# Patient Record
Sex: FEMALE | Race: WHITE | NOT HISPANIC OR LATINO | Employment: FULL TIME | URBAN - METROPOLITAN AREA
[De-identification: names, ages, dates, MRNs, and addresses within clinical notes are randomized per-mention and may not be internally consistent; named-entity substitution may affect disease eponyms.]

---

## 2017-01-03 ENCOUNTER — OFFICE VISIT (OUTPATIENT)
Dept: URGENT CARE | Facility: RETAIL CLINIC | Age: 37
End: 2017-01-03
Payer: COMMERCIAL

## 2017-01-03 VITALS
DIASTOLIC BLOOD PRESSURE: 71 MMHG | SYSTOLIC BLOOD PRESSURE: 132 MMHG | TEMPERATURE: 98.5 F | HEART RATE: 84 BPM | OXYGEN SATURATION: 95 %

## 2017-01-03 DIAGNOSIS — H69.93 DYSFUNCTION OF EUSTACHIAN TUBE, BILATERAL: ICD-10-CM

## 2017-01-03 DIAGNOSIS — J02.9 ACUTE PHARYNGITIS, UNSPECIFIED ETIOLOGY: Primary | ICD-10-CM

## 2017-01-03 LAB — S PYO AG THROAT QL IA.RAPID: NORMAL

## 2017-01-03 PROCEDURE — 99213 OFFICE O/P EST LOW 20 MIN: CPT | Performed by: PHYSICIAN ASSISTANT

## 2017-01-03 PROCEDURE — 87880 STREP A ASSAY W/OPTIC: CPT | Mod: QW | Performed by: PHYSICIAN ASSISTANT

## 2017-01-03 PROCEDURE — 87081 CULTURE SCREEN ONLY: CPT | Mod: 90 | Performed by: PHYSICIAN ASSISTANT

## 2017-01-03 RX ORDER — FLUTICASONE PROPIONATE 50 MCG
1-2 SPRAY, SUSPENSION (ML) NASAL DAILY
Qty: 1 BOTTLE | Refills: 1 | Status: SHIPPED | OUTPATIENT
Start: 2017-01-03 | End: 2017-08-21

## 2017-01-03 NOTE — MR AVS SNAPSHOT
After Visit Summary   1/3/2017    Keely Godinez    MRN: [id number]           Patient Information     Date Of Birth          1980        Visit Information        Provider Department      1/3/2017 11:00 AM Samira Cabrera PA-C [facility]        Today's Diagnoses     Acute pharyngitis, unspecified etiology    -  1     Dysfunction of eustachian tube, bilateral           Care Instructions       * PHARYNGITIS (Sore Throat),REPORT PENDING    Pharyngitis (sore throat) is often due to a virus, but can also be caused by the  strep  bacteria. This is called  strep throat . Both viral and strep infection can cause throat pain that is worse when swallowing, aching all over with headache and fever. Both types of infections are contagious. They may be spread by coughing, kissing or touching others after touching your mouth or nose, so wash your hands often.  A test has been done to determine whether or not you have strep throat. If it is positive for strep infection you will usually need to take antibiotics. If the test is negative, you probably have a viral pharyngitis, and antibiotic treatment will not help you recover.  HOME CARE:    If your symptoms are severe, rest at home for the first 2-3 days. If you are told that your test is positive for strep, you should be off work and school for the first two days of antibiotic treatment. After that, you will no longer be as contagious.    Children: Use acetaminophen (Tylenol) for fever, fussiness or discomfort. In infants over six months of age, you may use ibuprofen (Children's Motrin) instead of Tylenol. [NOTE: If your child has chronic liver or kidney disease or ever had a stomach ulcer or GI bleeding, talk with your doctor before using these medicines.]   (Aspirin should never be used in anyone under 18 years of age who is ill with a fever. It may cause severe liver damage.)  Adults: You may use acetaminophen (Tylenol) 650-1000 mg every 6  hours or ibuprofen (Motrin, Advil) 600 mg every 6-8 hours with food to control pain, if you are able to take these medicines. [NOTE: If you have chronic liver or kidney disease or ever had a stomach ulcer or GI bleeding, talk with your doctor before using these medicines.]    Throat lozenges or sprays (Chloraseptic and others), or gargling with warm salt water will reduce throat pain. Dissolve 1/2 teaspoon of salt in 1 glass of warm water. This is especially useful just before meals.     FOLLOW UP with your doctor as advised by our staff if you are not improving over the next week.  GET PROMPT MEDICAL ATTENTION  if any of the following occur:    Fever over 101 F (38.3 C) for more than three days    New or worsening ear pain, sinus pain or headache    Unable to swallow liquids or open your mouth wide due to throat pain    Trouble breathing    Muffled voice    New rash       [id number] [place] [address]. All rights reserved. This information is not intended as a substitute for professional medical care. Always follow your healthcare professional's instructions.          Follow-ups after your visit        Who to contact     You can reach your care team any time of the day by calling [phone].  Notification of test results:  If you have an abnormal lab result, we will notify you by phone as soon as possible.         Additional Information About Your Visit        MyChart Information     [employer] gives you secure access to your electronic health record. If you see a primary care provider, you can also send messages to your care team and make appointments. If you have questions, please call your primary care clinic.  If you do not have a primary care provider, please call [phone] and they will assist you.        Care EveryWhere ID     This is your Care EveryWhere ID. This could be used by other organizations to access your [facility] medical records  [id number]        Your  Vitals Were     Pulse Temperature Pulse Oximetry             84 98.5  F (36.9  C) (Tympanic) 95%          Blood Pressure from Last 3 Encounters:   01/03/17 132/71   11/28/16 135/82   10/10/16 128/93    Weight from Last 3 Encounters:   12/02/16 204 lb (92.534 kg)   11/03/16 204 lb (92.534 kg)   10/21/16 202 lb 9.6 oz (91.899 kg)              We Performed the Following     BETA STREP GROUP A R/O CULTURE     RAPID STREP SCREEN          Today's Medication Changes          These changes are accurate as of: 1/3/17 11:53 AM.  If you have any questions, ask your nurse or doctor.               Start taking these medicines.        Dose/Directions    fluticasone 50 MCG/ACT spray   Commonly known as:  FLONASE   Used for:  Dysfunction of eustachian tube, bilateral   Started by:  Samira Cabrera PA-C        Dose:  1-2 spray   Spray 1-2 sprays into both nostrils daily   Quantity:  1 Bottle   Refills:  1            Where to get your medicines      These medications were sent to [address]  [address] [facility] [address]     Phone:  [phone]    - fluticasone 50 MCG/ACT spray             Primary Care Provider Office Phone #    [employer] [facility] [phone]       No address on file        Thank you!     Thank you for choosing [facility]  for your care. Our goal is always to provide you with excellent care. Hearing back from our patients is one way we can continue to improve our services. Please take a few minutes to complete the written survey that you may receive in the mail after your visit with us. Thank you!             Your Updated Medication List - Protect others around you: Learn how to safely use, store and throw away your medicines at www.disposemymeds.org.          This list is accurate as of: 1/3/17 11:53 AM.  Always use your most recent med list.                   Brand Name Dispense Instructions for use    fluticasone 50 MCG/ACT spray     FLONASE    1 Bottle    Spray 1-2 sprays into both nostrils daily       IBUPROFEN PO      Take 600 mg by mouth as needed       nicotine 21 MG/24HR 24 hr patch    NICODERM CQ    30 patch    Place 1 patch onto the skin every 24 hours       OMEPRAZOLE PO      Take 20 mg by mouth as needed

## 2017-01-03 NOTE — PROGRESS NOTES
SUBJECTIVE:   Pt. presenting to [facility] -  with a chief complaint of ST. Ears plugged. Minimal nasal congestion. No cough..  Onset of symptoms gradual  Course of illness is same.    Severity moderate  Current and Associated symptoms: sore throat  Treatment measures tried include Fluids, OTC meds and Rest.  Predisposing factors include recent illness and tobacco abuse.  Last antibiotic 11/2016 for bronchitis    Pregnancy no  Smoker yes    Past Medical History   Diagnosis Date     Uncomplicated asthma      ASCUS of cervix with negative high risk HPV 5/26/16 5/26/16 ASCUS/neg HR HPV      Past Surgical History   Procedure Laterality Date     No history of surgery       Patient Active Problem List   Diagnosis     Tobacco use disorder     ASCUS of cervix with negative high risk HPV     Non morbid obesity due to excess calories     Persistent insomnia     CORINA (obstructive sleep apnea)- mild (AHI 6)     Acute pain of right shoulder     Current Outpatient Prescriptions   Medication     nicotine (NICODERM CQ) 21 MG/24HR 24 hr patch     OMEPRAZOLE PO     IBUPROFEN PO     No current facility-administered medications for this visit.     OBJECTIVE:  /71 mmHg  Pulse 84  Temp(Src) 98.5  F (36.9  C) (Tympanic)  SpO2 95%    GENERAL APPEARANCE: cooperative, alert and no distress. Appears well hydrated.  EYES: conjunctiva clear  HENT: Rt ear canal  clear and TM normal   Lt ear canal clear and TM normal   Nose some congestion. clear discharge  Mouth without ulcers or lesions. mild erythema. no exudate.  NECK: supple, few small shoddy NT ant nodes. No  posterior nodes.  RESP: lungs clear to auscultation - no rales, rhonchi or wheezes. Breathing easily.  CV: regular rates and rhythm  ABDOMEN:  soft, nontender, no HSM or masses and bowel sounds normal   SKIN: no suspicious lesions or rashes  no tenderness to palpate over  sinus areas.    Rapid strep neg    ASSESSMENT:     Acute pharyngitis, unspecified  etiology  Dysfunction of eustachian tube, bilateral      PLAN:  Symptomatic measures   Discussed ETD - try Flonase, saline nose spray and gentle autoinsufflation  Prescriptions as below. Discussed indications, dosing, side affects and adverse reactions of medications with  Patient -Flonase  Salt water gargles - throat lozenges or honey/lemon tea if soothing   Throat culture pending - will be notified of positive results only.  Cool mist vaporizer.  Smoking discouraged  -Discussed > CV,CP and cancer risks with tobacco use.   Stay in clean air environment.  > rest.  > fluids.  Contagiousness and hygiene discussed.  Fever and pain  control measures discussed.   If unable to swallow or any breathing difficulty to go to ED     Follow up with your primary care provider if not improving, anytime if worse and if symptoms do not resolve.  AVS given and discussed:  Patient Instructions      * PHARYNGITIS (Sore Throat),REPORT PENDING    Pharyngitis (sore throat) is often due to a virus, but can also be caused by the  strep  bacteria. This is called  strep throat . Both viral and strep infection can cause throat pain that is worse when swallowing, aching all over with headache and fever. Both types of infections are contagious. They may be spread by coughing, kissing or touching others after touching your mouth or nose, so wash your hands often.  A test has been done to determine whether or not you have strep throat. If it is positive for strep infection you will usually need to take antibiotics. If the test is negative, you probably have a viral pharyngitis, and antibiotic treatment will not help you recover.  HOME CARE:    If your symptoms are severe, rest at home for the first 2-3 days. If you are told that your test is positive for strep, you should be off work and school for the first two days of antibiotic treatment. After that, you will no longer be as contagious.    Children: Use acetaminophen (Tylenol) for fever,  fussiness or discomfort. In infants over six months of age, you may use ibuprofen (Children's Motrin) instead of Tylenol. [NOTE: If your child has chronic liver or kidney disease or ever had a stomach ulcer or GI bleeding, talk with your doctor before using these medicines.]   (Aspirin should never be used in anyone under 18 years of age who is ill with a fever. It may cause severe liver damage.)  Adults: You may use acetaminophen (Tylenol) 650-1000 mg every 6 hours or ibuprofen (Motrin, Advil) 600 mg every 6-8 hours with food to control pain, if you are able to take these medicines. [NOTE: If you have chronic liver or kidney disease or ever had a stomach ulcer or GI bleeding, talk with your doctor before using these medicines.]    Throat lozenges or sprays (Chloraseptic and others), or gargling with warm salt water will reduce throat pain. Dissolve 1/2 teaspoon of salt in 1 glass of warm water. This is especially useful just before meals.     FOLLOW UP with your doctor as advised by our staff if you are not improving over the next week.  GET PROMPT MEDICAL ATTENTION  if any of the following occur:    Fever over 101 F (38.3 C) for more than three days    New or worsening ear pain, sinus pain or headache    Unable to swallow liquids or open your mouth wide due to throat pain    Trouble breathing    Muffled voice    New rash       [id number] [employer] [facility], [address] [place] [address]. All rights reserved. This information is not intended as a substitute for professional medical care. Always follow your healthcare professional's instructions.        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  Electronically signed,  GENA Cabrera, PAC

## 2017-01-03 NOTE — Clinical Note
[facility]  [address] [place] [id number]        1/3/2017    Keely Riggs was seen 1/3/2017 at the [facility] in [place], [place]. Please excuse Keely from work 1/2,3 and possibly 4/2017  due to illness. Keely may return to work 1/4/2017 if  afebrile x 1 day and feeling better.      Cordially,        Samira Cabrera, PAC

## 2017-01-03 NOTE — PATIENT INSTRUCTIONS

## 2017-01-05 ENCOUNTER — OFFICE VISIT (OUTPATIENT)
Dept: FAMILY MEDICINE | Facility: CLINIC | Age: 37
End: 2017-01-05
Payer: COMMERCIAL

## 2017-01-05 VITALS
BODY MASS INDEX: 35.51 KG/M2 | RESPIRATION RATE: 18 BRPM | DIASTOLIC BLOOD PRESSURE: 82 MMHG | WEIGHT: 207 LBS | TEMPERATURE: 98.1 F | HEART RATE: 96 BPM | SYSTOLIC BLOOD PRESSURE: 122 MMHG | OXYGEN SATURATION: 100 %

## 2017-01-05 DIAGNOSIS — H65.01 RIGHT ACUTE SEROUS OTITIS MEDIA, RECURRENCE NOT SPECIFIED: ICD-10-CM

## 2017-01-05 DIAGNOSIS — J06.9 VIRAL URI WITH COUGH: Primary | ICD-10-CM

## 2017-01-05 LAB — BETA STREP CONFIRM: NORMAL

## 2017-01-05 PROCEDURE — 99213 OFFICE O/P EST LOW 20 MIN: CPT | Performed by: NURSE PRACTITIONER

## 2017-01-05 RX ORDER — AMOXICILLIN 875 MG
875 TABLET ORAL 2 TIMES DAILY
Qty: 20 TABLET | Refills: 0 | Status: SHIPPED | OUTPATIENT
Start: 2017-01-05 | End: 2017-02-03

## 2017-01-05 ASSESSMENT — PAIN SCALES - GENERAL: PAINLEVEL: MODERATE PAIN (4)

## 2017-01-05 NOTE — NURSING NOTE
"Chief Complaint   Patient presents with     Pharyngitis       Initial There were no vitals taken for this visit. Estimated body mass index is 35.00 kg/(m^2) as calculated from the following:    Height as of 12/2/16: 5' 4\" (1.626 m).    Weight as of 12/2/16: 204 lb (92.534 kg).  BP completed using cuff size: yimi Alford MA      "

## 2017-01-05 NOTE — MR AVS SNAPSHOT
After Visit Summary   1/5/2017    Keely Godinez    MRN: [id number]           Patient Information     Date Of Birth          1980        Visit Information        Provider Department      1/5/2017 2:45 PM Johanna Sharma APRN CNP [facility]        Today's Diagnoses     Viral URI with cough    -  1     Right acute serous otitis media, recurrence not specified            Follow-ups after your visit        Who to contact     If you have questions or need follow up information about today's clinic visit or your schedule please contact [facility] directly at [phone].  Normal or non-critical lab and imaging results will be communicated to you by Diabeticahart, letter or phone within 4 business days after the clinic has received the results. If you do not hear from us within 7 days, please contact the clinic through Diabeticahart or phone. If you have a critical or abnormal lab result, we will notify you by phone as soon as possible.  Submit refill requests through [employer] or call your pharmacy and they will forward the refill request to us. Please allow 3 business days for your refill to be completed.          Additional Information About Your Visit        MyChart Information     [employer] gives you secure access to your electronic health record. If you see a primary care provider, you can also send messages to your care team and make appointments. If you have questions, please call your primary care clinic.  If you do not have a primary care provider, please call [phone] and they will assist you.        Care EveryWhere ID     This is your Care EveryWhere ID. This could be used by other organizations to access your [facility] medical records  [id number]        Your Vitals Were     Pulse Temperature Respirations Pulse Oximetry          96 98.1  F (36.7  C) (Tympanic) 18 100%         Blood Pressure from Last 3 Encounters:   01/05/17 122/82   01/03/17 132/71   11/28/16 135/82     Weight from Last 3 Encounters:   01/05/17 207 lb (93.895 kg)   12/02/16 204 lb (92.534 kg)   11/03/16 204 lb (92.534 kg)              Today, you had the following     No orders found for display         Today's Medication Changes          These changes are accurate as of: 1/5/17  3:42 PM.  If you have any questions, ask your nurse or doctor.               Start taking these medicines.        Dose/Directions    amoxicillin 875 MG tablet   Commonly known as:  AMOXIL   Used for:  Right acute serous otitis media, recurrence not specified   Started by:  Johanna Sharma APRN CNP        Dose:  875 mg   Take 1 tablet (875 mg) by mouth 2 times daily   Quantity:  20 tablet   Refills:  0            Where to get your medicines      These medications were sent to [facility], [place] - [address]   [address] [place] [address]     Phone:  [phone]    - amoxicillin 875 MG tablet             Primary Care Provider Office Phone #    [facility] [phone]       No address on file        Thank you!     Thank you for choosing [facility]  for your care. Our goal is always to provide you with excellent care. Hearing back from our patients is one way we can continue to improve our services. Please take a few minutes to complete the written survey that you may receive in the mail after your visit with us. Thank you!             Your Updated Medication List - Protect others around you: Learn how to safely use, store and throw away your medicines at www.disposemymeds.org.          This list is accurate as of: 1/5/17  3:42 PM.  Always use your most recent med list.                   Brand Name Dispense Instructions for use    amoxicillin 875 MG tablet    AMOXIL    20 tablet    Take 1 tablet (875 mg) by mouth 2 times daily       fluticasone 50 MCG/ACT spray    FLONASE    1 Bottle    Spray 1-2 sprays into both nostrils daily       IBUPROFEN PO      Take  600 mg by mouth as needed       nicotine 21 MG/24HR 24 hr patch    NICODERM CQ    30 patch    Place 1 patch onto the skin every 24 hours       OMEPRAZOLE PO      Take 20 mg by mouth as needed

## 2017-01-05 NOTE — PROGRESS NOTES
SUBJECTIVE:                                                    Keely Godinez is a 36 year old female who presents to clinic today for the following health issues:      Acute Illness   Acute illness concerns: sore throat , cough   Onset: 5 days      Fever: YES- at night     Chills/Sweats: no    Headache (location?): YES    Sinus Pressure:yes     Conjunctivitis:  no    Ear Pain: YES: both    Rhinorrhea: YES    Congestion: YES    Sore Throat: YES     Cough: YES    Wheeze: YES    Decreased Appetite: yes    Nausea: no    Vomiting: no    Diarrhea:  no    Dysuria/Freq.: no    Fatigue/Achiness: YES    Sick/Strep Exposure: no     Therapies Tried and outcome: ibuprofen       Patient is a 36-year-old female who was seen in [facility] 2 days ago with very sore throat. Strep culture has returned negative. She reports nasal congestion, chest congestion and persistent nonproductive cough. She also reports ear pain, diminished hearing, particularly in the right ear.    Problem list and histories reviewed & adjusted, as indicated.  Additional history: as documented    BP Readings from Last 3 Encounters:   01/05/17 122/82   01/03/17 132/71   11/28/16 135/82    Wt Readings from Last 3 Encounters:   01/05/17 207 lb (93.895 kg)   12/02/16 204 lb (92.534 kg)   11/03/16 204 lb (92.534 kg)                    ROS:  Constitutional, HEENT, cardiovascular, pulmonary, gi and gu systems are negative, except as otherwise noted.    OBJECTIVE:                                                    /82 mmHg  Pulse 96  Temp(Src) 98.1  F (36.7  C) (Tympanic)  Resp 18  Wt 207 lb (93.895 kg)  SpO2 100%  Body mass index is 35.51 kg/(m^2).  GENERAL: well-nourished, well-hydrated, no acute distress  EYES: Eyes grossly normal to inspection, PERRL and conjunctivae and sclerae normal  HENT: left ear canal is clear, TM pearly gray. Right ear canal is clear, TM is erythematous and bulging with loss of normal landmarks. Moderate erythema posterior  oropharynx without exudate  NECK: no adenopathy, no asymmetry, masses, or scars and thyroid normal to palpation  RESP: lungs clear to auscultation - no rales, rhonchi or wheezes  CV: regular rates and rhythm, normal S1 S2, no S3 or S4 and no murmur, click or rub         ASSESSMENT/PLAN:                                                      Problem List Items Addressed This Visit     None      Visit Diagnoses     Viral URI with cough    -  Primary     Right acute serous otitis media, recurrence not specified         Relevant Medications     amoxicillin (AMOXIL) 875 MG tablet          Symptomatic measures for management of cough including increased humidification, Mucinex, Robitussin or Delsym for cough suppression as needed; avoid cigarette smoking  Amoxicillin 875 mg b.i.d. For 10 days for treatment of otitis media  She has a prescription for Flonase, instructed to instill the sprays into the right nostril while lying supine and turning her head towards the right in order to facilitate  Relieving inflammation of the eustachian tube    The anticipated course of a viral illness was discussed with the patient. Follow up in clinic if symptoms have not improved in 5-6 days    OLIVERIO White CNP  [facility]

## 2017-02-03 ENCOUNTER — OFFICE VISIT (OUTPATIENT)
Dept: FAMILY MEDICINE | Facility: CLINIC | Age: 37
End: 2017-02-03
Payer: COMMERCIAL

## 2017-02-03 VITALS
RESPIRATION RATE: 16 BRPM | HEART RATE: 96 BPM | DIASTOLIC BLOOD PRESSURE: 60 MMHG | SYSTOLIC BLOOD PRESSURE: 104 MMHG | HEIGHT: 64 IN | WEIGHT: 206.1 LBS | BODY MASS INDEX: 35.19 KG/M2 | OXYGEN SATURATION: 100 % | TEMPERATURE: 97.8 F

## 2017-02-03 DIAGNOSIS — J20.9 ACUTE BRONCHITIS WITH SYMPTOMS > 10 DAYS: ICD-10-CM

## 2017-02-03 PROCEDURE — 99213 OFFICE O/P EST LOW 20 MIN: CPT | Performed by: FAMILY MEDICINE

## 2017-02-03 RX ORDER — ALBUTEROL SULFATE 90 UG/1
2 AEROSOL, METERED RESPIRATORY (INHALATION) EVERY 4 HOURS PRN
Qty: 1 INHALER | Refills: 0 | Status: SHIPPED | OUTPATIENT
Start: 2017-02-03 | End: 2017-08-21

## 2017-02-03 RX ORDER — CETIRIZINE HYDROCHLORIDE 10 MG/1
10 TABLET ORAL EVERY EVENING
Qty: 30 TABLET | Refills: 3 | Status: SHIPPED | OUTPATIENT
Start: 2017-02-03 | End: 2019-04-29

## 2017-02-03 RX ORDER — AZITHROMYCIN 250 MG/1
TABLET, FILM COATED ORAL
Qty: 6 TABLET | Refills: 0 | Status: SHIPPED | OUTPATIENT
Start: 2017-02-03 | End: 2017-08-21

## 2017-02-03 ASSESSMENT — PAIN SCALES - GENERAL: PAINLEVEL: NO PAIN (0)

## 2017-02-03 NOTE — PROGRESS NOTES
SUBJECTIVE:                                                    Keely Godinez is a 36 year old female who presents to clinic today for the following health issues:    Acute Illness   Acute illness concerns: cough, sinus, voice  Onset: on and off 1 month     Fever: no    Chills/Sweats: YES    Headache (location?): YES    Sinus Pressure:YES    Conjunctivitis:  YES- sometimes    Ear Pain: YES: left    Rhinorrhea: YES    Congestion: YES    Sore Throat: YES     Cough: YES-productive of green sputum    Wheeze: no    Decreased Appetite: no    Nausea: no    Vomiting: no    Diarrhea:  no    Dysuria/Freq.: no    Fatigue/Achiness: no    Sick/Strep Exposure: no     Therapies Tried and outcome: amoxicillin    Also has a lot of pressure on the ears with sinus pressure and pain. Coughing - productive which is worse at night. No wheezing or tightness sensation.  Eating and drinking ok. No tooth pain or sensitivity.  No N/V. Smoking, but has cut back significantly.  Sore throat is resolving. No wheezing or chest tightness sensation. No dyspnea, orthopnea or leg swelling. The soreness is getting better slowly. The cough is getting worse.    Problem list and histories reviewed & adjusted, as indicated.  Additional history: as documented    Patient Active Problem List   Diagnosis     Tobacco use disorder     ASCUS of cervix with negative high risk HPV     Non morbid obesity due to excess calories     Persistent insomnia     CORINA (obstructive sleep apnea)- mild (AHI 6)     Past Surgical History   Procedure Laterality Date     No history of surgery         Social History   Substance Use Topics     Smoking status: Current Every Day Smoker -- 0.75 packs/day for 23 years     Types: Cigarettes     Smokeless tobacco: Never Used      Comment: started age 13     Alcohol Use: 0.0 oz/week     0 Standard drinks or equivalent per week      Comment: socially     Family History   Problem Relation Age of Onset     Sleep Apnea Mother      Obesity  "Mother      Suicide Father 37     Depression Father      Anxiety Disorder Father      Asthma Maternal Grandmother      Coronary Artery Disease Maternal Grandfather      Coronary Artery Disease Paternal Grandfather      Obesity Brother      Sleep Apnea Brother      Sleep Apnea Mother          Current Outpatient Prescriptions   Medication Sig Dispense Refill     fluticasone (FLONASE) 50 MCG/ACT spray Spray 1-2 sprays into both nostrils daily 1 Bottle 1     OMEPRAZOLE PO Take 20 mg by mouth as needed       IBUPROFEN PO Take 600 mg by mouth as needed        nicotine (NICODERM CQ) 21 MG/24HR 24 hr patch Place 1 patch onto the skin every 24 hours 30 patch 1     Allergies   Allergen Reactions     Fentanyl      Other reaction(s): Respiratory Depression     Midazolam      Other reaction(s): Respiratory Depression     No Clinical Screening - See Comments Difficulty breathing     She is unsure of name. Numbing medicine, starts with an \"s or C\".      Seasonal Allergies      Varenicline      assaultive ideation       ROS:  Constitutional, HEENT, cardiovascular, pulmonary, gi and gu systems are negative, except as otherwise noted.    OBJECTIVE:                                                    /60 mmHg  Pulse 96  Temp(Src) 97.8  F (36.6  C) (Temporal)  Resp 16  Ht 5' 4\" (1.626 m)  Wt 206 lb 1.6 oz (93.486 kg)  BMI 35.36 kg/m2  SpO2 100%  LMP 01/09/2017  Breastfeeding? No  Body mass index is 35.36 kg/(m^2).   GENERAL: healthy, alert and no distress.  Speak in full sentences.  HENT: ear canals and TM's normal.  Nares are congested with clear drainage.  Oropharynx is pink and moist.  No tonsilar redness, exudate or hypertrophy.  No tender with palpation to the sinuses.  NECK: no adenopathy.  RESP: lungs clear to auscultation - no rales, rhonchi or wheezes  CV: regular rate and rhythm, no murmur.  ABDOMEN: soft, non-tender and bowel sounds normal      Diagnostic Test Results:  none      ASSESSMENT/PLAN:                "                                         ICD-10-CM    1. Acute bronchitis with symptoms > 10 days J20.9 cetirizine (ZYRTEC) 10 MG tablet     azithromycin (ZITHROMAX) 250 MG tablet     albuterol (PROAIR HFA/PROVENTIL HFA/VENTOLIN HFA) 108 (90 BASE) MCG/ACT Inhaler     Discussed with her about the nature of the condition.  Start Zytec and continue with Flonase.  Started her a 5 days course of Zithromax and encourage her to complete the whole course of antibiotic as prescribed.  Albuterol inhaler as needed for coughing, wheezing, chest tightness sensation or shortness of breath. Encouraged to stop smoking. Call in if not improve or worsening of he symptoms.  All of her questions were answered.    Juan Nuno Mai, MD  [facility]

## 2017-02-03 NOTE — NURSING NOTE
"Chief Complaint   Patient presents with     Sinus Problem     Cough       Initial /60 mmHg  Pulse 96  Temp(Src) 97.8  F (36.6  C) (Temporal)  Resp 16  Ht 5' 4\" (1.626 m)  Wt 206 lb 1.6 oz (93.486 kg)  BMI 35.36 kg/m2  SpO2 100%  LMP 01/09/2017  Breastfeeding? No Estimated body mass index is 35.36 kg/(m^2) as calculated from the following:    Height as of this encounter: 5' 4\" (1.626 m).    Weight as of this encounter: 206 lb 1.6 oz (93.486 kg).  BP completed using cuff size: large    Health Maintenance Due   Topic Date Due     INFLUENZA VACCINE (SYSTEM ASSIGNED)  09/01/2016     Lobo Hillman CMA      "

## 2017-08-21 ENCOUNTER — TELEPHONE (OUTPATIENT)
Dept: FAMILY MEDICINE | Facility: CLINIC | Age: 37
End: 2017-08-21

## 2017-08-21 ENCOUNTER — OFFICE VISIT (OUTPATIENT)
Dept: FAMILY MEDICINE | Facility: CLINIC | Age: 37
End: 2017-08-21
Payer: COMMERCIAL

## 2017-08-21 VITALS
WEIGHT: 206 LBS | HEIGHT: 64 IN | HEART RATE: 76 BPM | OXYGEN SATURATION: 99 % | BODY MASS INDEX: 35.17 KG/M2 | DIASTOLIC BLOOD PRESSURE: 80 MMHG | SYSTOLIC BLOOD PRESSURE: 120 MMHG | RESPIRATION RATE: 18 BRPM | TEMPERATURE: 98 F

## 2017-08-21 DIAGNOSIS — H66.002 ACUTE SUPPURATIVE OTITIS MEDIA OF LEFT EAR WITHOUT SPONTANEOUS RUPTURE OF TYMPANIC MEMBRANE, RECURRENCE NOT SPECIFIED: Primary | ICD-10-CM

## 2017-08-21 DIAGNOSIS — H66.002 ACUTE SUPPURATIVE OTITIS MEDIA OF LEFT EAR WITHOUT SPONTANEOUS RUPTURE OF TYMPANIC MEMBRANE, RECURRENCE NOT SPECIFIED: ICD-10-CM

## 2017-08-21 DIAGNOSIS — Z72.0 TOBACCO ABUSE: ICD-10-CM

## 2017-08-21 DIAGNOSIS — J30.2 ACUTE SEASONAL ALLERGIC RHINITIS, UNSPECIFIED TRIGGER: ICD-10-CM

## 2017-08-21 PROCEDURE — 99214 OFFICE O/P EST MOD 30 MIN: CPT | Performed by: PHYSICIAN ASSISTANT

## 2017-08-21 RX ORDER — VARENICLINE TARTRATE 1 MG/1
1 TABLET, FILM COATED ORAL 2 TIMES DAILY
Qty: 56 TABLET | Refills: 2 | COMMUNITY
Start: 2017-08-21 | End: 2017-11-18

## 2017-08-21 RX ORDER — AMOXICILLIN 875 MG
875 TABLET ORAL 2 TIMES DAILY
Qty: 14 TABLET | Refills: 0 | Status: SHIPPED | OUTPATIENT
Start: 2017-08-21 | End: 2017-08-21

## 2017-08-21 RX ORDER — AMOXICILLIN 875 MG
875 TABLET ORAL 2 TIMES DAILY
Qty: 14 TABLET | Refills: 0 | COMMUNITY
Start: 2017-08-21 | End: 2017-11-18

## 2017-08-21 RX ORDER — VARENICLINE TARTRATE 1 MG/1
1 TABLET, FILM COATED ORAL 2 TIMES DAILY
Qty: 56 TABLET | Refills: 2 | Status: SHIPPED | OUTPATIENT
Start: 2017-08-21 | End: 2017-08-21

## 2017-08-21 NOTE — TELEPHONE ENCOUNTER
Rx sent today by Leola to [facility] pharmacy. She was at [facility] in [place] where they were to be sent.    Rx's cancelled at [facility] pharmacy and phone to [facility].      RN please sign off on this to reflect pharmacy called to.

## 2017-08-21 NOTE — PATIENT INSTRUCTIONS
"L ear infection, R looks fine  Suspect allergies as underlying cause, but could have been viral as well  Suggest flonase or nasacort allergy sprays.  Best benefit after 2 wks or so of daily use.  Or claritin, zyrtec, or allegra OTC allergy pills.    Retrying chantix - self monitor and have a few others monitor for behavioral/mood concerns, and quit med if becoming problematic  Discussed ways to quit smoking  See me if having more troubles      How to quit smoking:    Know your reasons for quitting!  Remind yourself of these reasons when you struggle with the urge to smoking.  Post these reasons in a visible place such as a post-it note on your mirror in the bathroom or on the dash of your car.    Pick a quit date.  Set yourself up for success by planning how to be successful.  Have a strategy, including having others hold you accountable to your plans to quit.    Tell others you are quitting.  Try to get them to encourage your progress and hold you accountable, but not be overly praising for just telling them of your intention to quit.  Too much praise at start (other than encouragement) actually can discourage quitting as you will have received positive feedback already and be less inclined to follow through on plans to quit.  Have people hold you accountable -- ask them to challenge you to quit smoking if they see you picking up a cigarette.  Don't have them turn a \"blind eye\".  The best people to hold you accountable are those who genuinely care about you and are around you frequently.    Remove triggers for smoking.  Don't hang out with other smokers, or alternatively band them with you in attempt to quit.  If you always smoke while in the car, have a strategy in place to deal with the smoking urge that is likely triggered by being in a car.      Throw away all smoking gear -- cigs, lighters, dasha trays.  Don't put them in the basement for use \"in case you fail to quit smoking\".  No -- make it harder for yourself " to start smoking again.    Set up a reward for yourself.  This should be a non-food reward.  Set aside the money that you are saving by not buying cigarettes, and have fun with it if you have not smoked for 3 months (or whatever your goal is -- it might be longer).  Alternatively, some people will set aside time or money to support an organization that they dislike; the idea is that success in quitting means that they don't have to support that detested organization, and so they feel more motivated to succeed in quitting smoking.  Have someone hold you accountable to this.    When you feel urge to smoke, set yourself up for success.  Remove access to cigarettes -- go for walk without wallet, call a support person, delay acting on urge for 15-30 minutes or more, remind yourself of your motivations for quitting.  Ok to use nicotine gum or other nicotine replacement but it may not have any benefit due to how Chantix works.      Have a replacement activity available for your hands or mouth.  You might chew gum (nicotine free or otherwise) or chew toothpick. You might draw or do other activity with hands.     Be gentle with yourself -- if you start smoking, just pick yourself up again and quit again.  If you smoke a few cigarettes some day, it doesn't mean the day is a failure (or that you should just smoke all you want on that day).  Each cigarette that you don't smoke is a success, and so fight for each success.      If you don't manage to quit smoking on this try, try again!  When you succeed in quitting, each year on the anniversary of your quit date, celebrate!  This is a huge success.

## 2017-08-21 NOTE — MR AVS SNAPSHOT
"              After Visit Summary   8/21/2017    Keely Godinez    MRN: [id number]           Patient Information     Date Of Birth          1980        Visit Information        Provider Department      8/21/2017 8:40 AM Leola Jackson PA-C [facility]        Today's Diagnoses     Acute suppurative otitis media of left ear without spontaneous rupture of tympanic membrane, recurrence not specified    -  1    Acute seasonal allergic rhinitis, unspecified trigger        Tobacco abuse          Care Instructions    L ear infection, R looks fine  Suspect allergies as underlying cause, but could have been viral as well  Suggest flonase or nasacort allergy sprays.  Best benefit after 2 wks or so of daily use.  Or claritin, zyrtec, or allegra OTC allergy pills.    Retrying chantix - self monitor and have a few others monitor for behavioral/mood concerns, and quit med if becoming problematic  Discussed ways to quit smoking  See me if having more troubles      How to quit smoking:    Know your reasons for quitting!  Remind yourself of these reasons when you struggle with the urge to smoking.  Post these reasons in a visible place such as a post-it note on your mirror in the bathroom or on the dash of your car.    Pick a quit date.  Set yourself up for success by planning how to be successful.  Have a strategy, including having others hold you accountable to your plans to quit.    Tell others you are quitting.  Try to get them to encourage your progress and hold you accountable, but not be overly praising for just telling them of your intention to quit.  Too much praise at start (other than encouragement) actually can discourage quitting as you will have received positive feedback already and be less inclined to follow through on plans to quit.  Have people hold you accountable -- ask them to challenge you to quit smoking if they see you picking up a cigarette.  Don't have them turn a \"blind eye\".  " "The best people to hold you accountable are those who genuinely care about you and are around you frequently.    Remove triggers for smoking.  Don't hang out with other smokers, or alternatively band them with you in attempt to quit.  If you always smoke while in the car, have a strategy in place to deal with the smoking urge that is likely triggered by being in a car.      Throw away all smoking gear -- cigs, lighters, dasha trays.  Don't put them in the basement for use \"in case you fail to quit smoking\".  No -- make it harder for yourself to start smoking again.    Set up a reward for yourself.  This should be a non-food reward.  Set aside the money that you are saving by not buying cigarettes, and have fun with it if you have not smoked for 3 months (or whatever your goal is -- it might be longer).  Alternatively, some people will set aside time or money to support an organization that they dislike; the idea is that success in quitting means that they don't have to support that detested organization, and so they feel more motivated to succeed in quitting smoking.  Have someone hold you accountable to this.    When you feel urge to smoke, set yourself up for success.  Remove access to cigarettes -- go for walk without wallet, call a support person, delay acting on urge for 15-30 minutes or more, remind yourself of your motivations for quitting.  Ok to use nicotine gum or other nicotine replacement but it may not have any benefit due to how Chantix works.      Have a replacement activity available for your hands or mouth.  You might chew gum (nicotine free or otherwise) or chew toothpick. You might draw or do other activity with hands.     Be gentle with yourself -- if you start smoking, just pick yourself up again and quit again.  If you smoke a few cigarettes some day, it doesn't mean the day is a failure (or that you should just smoke all you want on that day).  Each cigarette that you don't smoke is a success, " "and so fight for each success.      If you don't manage to quit smoking on this try, try again!  When you succeed in quitting, each year on the anniversary of your quit date, celebrate!  This is a huge success.          Follow-ups after your visit        Who to contact     If you have questions or need follow up information about today's clinic visit or your schedule please contact [facility] directly at [phone].  Normal or non-critical lab and imaging results will be communicated to you by Jigluhart, letter or phone within 4 business days after the clinic has received the results. If you do not hear from us within 7 days, please contact the clinic through Arcaris or phone. If you have a critical or abnormal lab result, we will notify you by phone as soon as possible.  Submit refill requests through Ar[employer] or call your pharmacy and they will forward the refill request to us. Please allow 3 business days for your refill to be completed.          Additional Information About Your Visit        Ar[employer]is Information     [employer] gives you secure access to your electronic health record. If you see a primary care provider, you can also send messages to your care team and make appointments. If you have questions, please call your primary care clinic.  If you do not have a primary care provider, please call [phone] and they will assist you.        Care EveryWhere ID     This is your Care EveryWhere ID. This could be used by other organizations to access your [place] medical records  [id number]        Your Vitals Were     Pulse Temperature Respirations Height Pulse Oximetry Breastfeeding?    76 98  F (36.7  C) (Tympanic) 18 5' 4\" (1.626 m) 99% No    BMI (Body Mass Index)                   35.36 kg/m2            Blood Pressure from Last 3 Encounters:   08/21/17 120/80   02/03/17 104/60   01/05/17 122/82    Weight from Last 3 Encounters:   08/21/17 206 lb (93.4 kg)   02/03/17 206 lb 1.6 oz (93.5 kg) "   01/05/17 207 lb (93.9 kg)              Today, you had the following     No orders found for display         Today's Medication Changes          These changes are accurate as of: 8/21/17  9:26 AM.  If you have any questions, ask your nurse or doctor.               Start taking these medicines.        Dose/Directions    amoxicillin 875 MG tablet   Commonly known as:  AMOXIL   Used for:  Acute suppurative otitis media of left ear without spontaneous rupture of tympanic membrane, recurrence not specified   Started by:  Leola Jackson PA-C        Dose:  875 mg   Take 1 tablet (875 mg) by mouth 2 times daily for 7 days   Quantity:  14 tablet   Refills:  0       * varenicline 0.5 MG X 11 & 1 MG X 42 tablet   Commonly known as:  CHANTIX STARTING MONTH ROLANDO   Used for:  Tobacco abuse   Started by:  Leola Jackson PA-C        Take 0.5 mg tab daily for 3 days, then 0.5 mg tab twice daily for 4 days, then 1 mg twice daily.   Quantity:  53 tablet   Refills:  0       * varenicline 1 MG tablet   Commonly known as:  CHANTIX   Used for:  Tobacco abuse   Started by:  Leola Jackson PA-C        Dose:  1 mg   Take 1 tablet (1 mg) by mouth 2 times daily   Quantity:  56 tablet   Refills:  2       * Notice:  This list has 2 medication(s) that are the same as other medications prescribed for you. Read the directions carefully, and ask your doctor or other care provider to review them with you.         Where to get your medicines      These medications were sent to [facility] [address] [address]     Phone:  [phone]     amoxicillin 875 MG tablet    varenicline 0.5 MG X 11 & 1 MG X 42 tablet    varenicline 1 MG tablet                Primary Care Provider Office Phone #    [facility] [phone]       No address on file        Equal Access to Services     ROSA SANDERS : spike Gaspar  vushelby tommy orellana. So [facility] [phone].    ATENCIÓN: Si sascha sheriff, tiene a eason disposición servicios gratuitos de asistencia lingüística. Olena al [phone].    We comply with applicable federal civil rights laws and [place] laws. We do not discriminate on the basis of race, color, national origin, age, disability sex, sexual orientation or gender identity.            Thank you!     Thank you for choosing [facility]  for your care. Our goal is always to provide you with excellent care. Hearing back from our patients is one way we can continue to improve our services. Please take a few minutes to complete the written survey that you may receive in the mail after your visit with us. Thank you!             Your Updated Medication List - Protect others around you: Learn how to safely use, store and throw away your medicines at www.disposemymeds.org.          This list is accurate as of: 8/21/17  9:26 AM.  Always use your most recent med list.                   Brand Name Dispense Instructions for use Diagnosis    amoxicillin 875 MG tablet    AMOXIL    14 tablet    Take 1 tablet (875 mg) by mouth 2 times daily for 7 days    Acute suppurative otitis media of left ear without spontaneous rupture of tympanic membrane, recurrence not specified       cetirizine 10 MG tablet    zyrTEC    30 tablet    Take 1 tablet (10 mg) by mouth every evening    Acute bronchitis with symptoms > 10 days       IBUPROFEN PO      Take 600 mg by mouth as needed        nicotine 21 MG/24HR 24 hr patch    NICODERM CQ    30 patch    Place 1 patch onto the skin every 24 hours    Tobacco use disorder       OMEPRAZOLE PO      Take 20 mg by mouth as needed        * varenicline 0.5 MG X 11 & 1 MG X 42 tablet    CHANTIX STARTING MONTH ROLANDO    53 tablet    Take 0.5 mg tab daily for 3 days, then 0.5 mg tab twice daily for 4 days, then 1 mg twice daily.    Tobacco abuse        * varenicline 1 MG tablet    CHANTIX    56 tablet    Take 1 tablet (1 mg) by mouth 2 times daily    Tobacco abuse       * Notice:  This list has 2 medication(s) that are the same as other medications prescribed for you. Read the directions carefully, and ask your doctor or other care provider to review them with you.

## 2017-08-21 NOTE — NURSING NOTE
"Chief Complaint   Patient presents with     Sick     Medication Reconciliation     Not on Albuterol or Flonase, she is out of her zyrtec.       Initial /80  Pulse 76  Temp 98  F (36.7  C) (Tympanic)  Resp 18  Ht 5' 4\" (1.626 m)  Wt 206 lb (93.4 kg)  SpO2 99%  Breastfeeding? No  BMI 35.36 kg/m2 Estimated body mass index is 35.36 kg/(m^2) as calculated from the following:    Height as of this encounter: 5' 4\" (1.626 m).    Weight as of this encounter: 206 lb (93.4 kg).  Medication Reconciliation: complete    Health Maintenance that is potentially due pending provider review:  NONE    n/a    Is there anyone who you would like to be able to receive your results? No  If yes have patient fill out JARED    "

## 2017-08-21 NOTE — PROGRESS NOTES
SUBJECTIVE:   Keely Godinez is a 37 year old female who presents to clinic today for the following health issues:    ENT Symptoms           Symptoms: cc Present Absent Comment   Fever/Chills   x    Fatigue   x    Muscle Aches   x    Eye Irritation  x     Sneezing  x     Nasal Kendall/Drg  x     Sinus Pressure/Pain  x  slight   Loss of smell  x     Dental pain   x    Sore Throat   x    Swollen Glands   x    Ear Pain/Fullness x x  Sharp pain in ears, ears  Now feel plugged.   Cough   x    Wheeze   x    Chest Pain   x    Shortness of breath   x    Rash   x    Other         Symptom duration:  3 weeks   Symptom severity:  mod   Treatments tried:  ibuprofen and tylenol   Contacts:     Sharp pain in either ear intermittently, would last about 1 min.  Then started with sneezing and plugged ears.  Unsure about seasonal allergies, listed on chart but patient doesn't recall this diagnosis, does recall some annoying summer symptoms in past, and that allergy spray was helpful.  Minor cough, not different than usaul cough which she attributes to smoking.  History asthma.  History of ear infections.  No known teeth grinding, clenching, TMJ, or history of trigeminal neuralgia.  No rash.  Is a smoker, just under 1 ppd, hoping to quit within next 6 mo.  Not really around ill contacts.    Cessation -  Had some scary thoughts of hitting daughter or wanting to throw her across the room in past when on chantix, but wondering if could retry now that daughter is older.  Tried wellbutrin in past, doesn't recall if helpful, wonders if wasn't ready to quit.  Tried patches, gum.  Sig other smokes but quits easily.      Problem list and histories reviewed & adjusted, as indicated.  Additional history: as documented    BP Readings from Last 3 Encounters:   08/21/17 120/80   02/03/17 104/60   01/05/17 122/82    Wt Readings from Last 3 Encounters:   08/21/17 206 lb (93.4 kg)   02/03/17 206 lb 1.6 oz (93.5 kg)   01/05/17 207 lb (93.9 kg)     "       Reviewed and updated as needed this visit by clinical staffTobacco  Allergies  Meds  Problems  Med Hx  Surg Hx  Fam Hx  Soc Hx        Reviewed and updated as needed this visit by Provider  Tobacco  Allergies  Meds  Problems  Med Hx  Surg Hx  Fam Hx  Soc Hx          ROS:  Constitutional, HEENT, cardiovascular, pulmonary systems are negative, except as otherwise noted.    OBJECTIVE:   /80  Pulse 76  Temp 98  F (36.7  C) (Tympanic)  Resp 18  Ht 5' 4\" (1.626 m)  Wt 206 lb (93.4 kg)  SpO2 99%  Breastfeeding? No  BMI 35.36 kg/m2  Body mass index is 35.36 kg/(m^2).  GENERAL: healthy, alert and no distress  EYES: Eyes grossly normal to inspection, conjunctivae and sclerae normal  HENT: ear canals and TM's normal mild scarring on R, left TM moderate scarring, area of mild bulge but retracted over landmarks, 3rd quadrant purulent and redness, nose and mouth without ulcers or lesions  NECK: no adenopathy, no asymmetry, masses, or scars  RESP: lungs clear to auscultation - no rales, rhonchi or wheezes  CV: regular rate and rhythm, normal S1 S2, no S3 or S4, no murmur, click or rub    ASSESSMENT/PLAN:       ICD-10-CM    1. Acute suppurative otitis media of left ear without spontaneous rupture of tympanic membrane, recurrence not specified H66.002 amoxicillin (AMOXIL) 875 MG tablet   2. Acute seasonal allergic rhinitis, unspecified trigger J30.2    3. Tobacco abuse Z72.0 varenicline (CHANTIX STARTING MONTH ROLANDO) 0.5 MG X 11 & 1 MG X 42 tablet     varenicline (CHANTIX) 1 MG tablet       Patient Instructions   L ear infection, R looks fine  Suspect allergies as underlying cause, but could have been viral as well  Suggest flonase or nasacort allergy sprays.  Best benefit after 2 wks or so of daily use.  Or claritin, zyrtec, or allegra OTC allergy pills.    Retrying chantix - self monitor and have a few others monitor for behavioral/mood concerns, and quit med if becoming problematic  Discussed ways " "to quit smoking  See me if having more troubles      How to quit smoking:    Know your reasons for quitting!  Remind yourself of these reasons when you struggle with the urge to smoking.  Post these reasons in a visible place such as a post-it note on your mirror in the bathroom or on the dash of your car.    Pick a quit date.  Set yourself up for success by planning how to be successful.  Have a strategy, including having others hold you accountable to your plans to quit.    Tell others you are quitting.  Try to get them to encourage your progress and hold you accountable, but not be overly praising for just telling them of your intention to quit.  Too much praise at start (other than encouragement) actually can discourage quitting as you will have received positive feedback already and be less inclined to follow through on plans to quit.  Have people hold you accountable -- ask them to challenge you to quit smoking if they see you picking up a cigarette.  Don't have them turn a \"blind eye\".  The best people to hold you accountable are those who genuinely care about you and are around you frequently.    Remove triggers for smoking.  Don't hang out with other smokers, or alternatively band them with you in attempt to quit.  If you always smoke while in the car, have a strategy in place to deal with the smoking urge that is likely triggered by being in a car.      Throw away all smoking gear -- cigs, lighters, dasha trays.  Don't put them in the basement for use \"in case you fail to quit smoking\".  No -- make it harder for yourself to start smoking again.    Set up a reward for yourself.  This should be a non-food reward.  Set aside the money that you are saving by not buying cigarettes, and have fun with it if you have not smoked for 3 months (or whatever your goal is -- it might be longer).  Alternatively, some people will set aside time or money to support an organization that they dislike; the idea is that success " in quitting means that they don't have to support that detested organization, and so they feel more motivated to succeed in quitting smoking.  Have someone hold you accountable to this.    When you feel urge to smoke, set yourself up for success.  Remove access to cigarettes -- go for walk without wallet, call a support person, delay acting on urge for 15-30 minutes or more, remind yourself of your motivations for quitting.  Ok to use nicotine gum or other nicotine replacement but it may not have any benefit due to how Chantix works.      Have a replacement activity available for your hands or mouth.  You might chew gum (nicotine free or otherwise) or chew toothpick. You might draw or do other activity with hands.     Be gentle with yourself -- if you start smoking, just pick yourself up again and quit again.  If you smoke a few cigarettes some day, it doesn't mean the day is a failure (or that you should just smoke all you want on that day).  Each cigarette that you don't smoke is a success, and so fight for each success.      If you don't manage to quit smoking on this try, try again!  When you succeed in quitting, each year on the anniversary of your quit date, celebrate!  This is a huge success.      Leola Jackson PA-C  [facility]

## 2017-11-18 ENCOUNTER — HOSPITAL ENCOUNTER (EMERGENCY)
Facility: CLINIC | Age: 37
Discharge: HOME | End: 2017-11-18
Attending: EMERGENCY MEDICINE | Admitting: EMERGENCY MEDICINE
Payer: COMMERCIAL

## 2017-11-18 ENCOUNTER — APPOINTMENT (OUTPATIENT)
Dept: CT IMAGING | Facility: CLINIC | Age: 37
End: 2017-11-18
Attending: EMERGENCY MEDICINE
Payer: COMMERCIAL

## 2017-11-18 ENCOUNTER — OFFICE VISIT (OUTPATIENT)
Dept: URGENT CARE | Facility: URGENT CARE | Age: 37
End: 2017-11-18
Payer: COMMERCIAL

## 2017-11-18 VITALS
DIASTOLIC BLOOD PRESSURE: 81 MMHG | HEART RATE: 78 BPM | TEMPERATURE: 97.5 F | WEIGHT: 208.6 LBS | BODY MASS INDEX: 35.81 KG/M2 | OXYGEN SATURATION: 96 % | SYSTOLIC BLOOD PRESSURE: 130 MMHG

## 2017-11-18 VITALS
HEART RATE: 80 BPM | BODY MASS INDEX: 35.7 KG/M2 | DIASTOLIC BLOOD PRESSURE: 80 MMHG | OXYGEN SATURATION: 96 % | TEMPERATURE: 98 F | RESPIRATION RATE: 18 BRPM | WEIGHT: 208 LBS | SYSTOLIC BLOOD PRESSURE: 130 MMHG

## 2017-11-18 DIAGNOSIS — Z87.442 H/O RENAL CALCULI: ICD-10-CM

## 2017-11-18 DIAGNOSIS — R10.30 LOWER ABDOMINAL PAIN: Primary | ICD-10-CM

## 2017-11-18 DIAGNOSIS — R10.84 ABDOMINAL PAIN, GENERALIZED: ICD-10-CM

## 2017-11-18 DIAGNOSIS — R30.0 DYSURIA: ICD-10-CM

## 2017-11-18 DIAGNOSIS — R10.9 BILATERAL FLANK PAIN: ICD-10-CM

## 2017-11-18 LAB
ALBUMIN UR-MCNC: NEGATIVE MG/DL
ALBUMIN UR-MCNC: NEGATIVE MG/DL
ANION GAP SERPL CALCULATED.3IONS-SCNC: 7 MMOL/L (ref 3–14)
APPEARANCE UR: CLEAR
APPEARANCE UR: CLEAR
BASOPHILS # BLD AUTO: 0 10E9/L (ref 0–0.2)
BASOPHILS NFR BLD AUTO: 0.2 %
BILIRUB UR QL STRIP: NEGATIVE
BILIRUB UR QL STRIP: NEGATIVE
BUN SERPL-MCNC: 17 MG/DL (ref 7–30)
CALCIUM SERPL-MCNC: 9 MG/DL (ref 8.5–10.1)
CHLORIDE SERPL-SCNC: 106 MMOL/L (ref 94–109)
CO2 SERPL-SCNC: 24 MMOL/L (ref 20–32)
COLOR UR AUTO: NORMAL
COLOR UR AUTO: YELLOW
CREAT SERPL-MCNC: 0.83 MG/DL (ref 0.52–1.04)
DIFFERENTIAL METHOD BLD: NORMAL
EOSINOPHIL # BLD AUTO: 0.2 10E9/L (ref 0–0.7)
EOSINOPHIL NFR BLD AUTO: 3.1 %
ERYTHROCYTE [DISTWIDTH] IN BLOOD BY AUTOMATED COUNT: 12.6 % (ref 10–15)
GFR SERPL CREATININE-BSD FRML MDRD: 76 ML/MIN/1.7M2
GLUCOSE SERPL-MCNC: 97 MG/DL (ref 70–99)
GLUCOSE UR STRIP-MCNC: NEGATIVE MG/DL
GLUCOSE UR STRIP-MCNC: NEGATIVE MG/DL
HCG UR QL: NEGATIVE
HCT VFR BLD AUTO: 43 % (ref 35–47)
HGB BLD-MCNC: 14.7 G/DL (ref 11.7–15.7)
HGB UR QL STRIP: NEGATIVE
HGB UR QL STRIP: NEGATIVE
IMM GRANULOCYTES # BLD: 0 10E9/L (ref 0–0.4)
IMM GRANULOCYTES NFR BLD: 0.2 %
KETONES UR STRIP-MCNC: NEGATIVE MG/DL
KETONES UR STRIP-MCNC: NEGATIVE MG/DL
LEUKOCYTE ESTERASE UR QL STRIP: NEGATIVE
LEUKOCYTE ESTERASE UR QL STRIP: NEGATIVE
LYMPHOCYTES # BLD AUTO: 1.9 10E9/L (ref 0.8–5.3)
LYMPHOCYTES NFR BLD AUTO: 32 %
MCH RBC QN AUTO: 30.6 PG (ref 26.5–33)
MCHC RBC AUTO-ENTMCNC: 34.2 G/DL (ref 31.5–36.5)
MCV RBC AUTO: 90 FL (ref 78–100)
MONOCYTES # BLD AUTO: 0.4 10E9/L (ref 0–1.3)
MONOCYTES NFR BLD AUTO: 6.3 %
NEUTROPHILS # BLD AUTO: 3.4 10E9/L (ref 1.6–8.3)
NEUTROPHILS NFR BLD AUTO: 58.2 %
NITRATE UR QL: NEGATIVE
NITRATE UR QL: NEGATIVE
PH UR STRIP: 5.5 PH (ref 5–7)
PH UR STRIP: 5.5 PH (ref 5–7)
PLATELET # BLD AUTO: 166 10E9/L (ref 150–450)
POTASSIUM SERPL-SCNC: 3.8 MMOL/L (ref 3.4–5.3)
RBC # BLD AUTO: 4.8 10E12/L (ref 3.8–5.2)
SODIUM SERPL-SCNC: 137 MMOL/L (ref 133–144)
SOURCE: NORMAL
SOURCE: NORMAL
SP GR UR STRIP: 1 (ref 1–1.03)
SP GR UR STRIP: 1.01 (ref 1–1.03)
UROBILINOGEN UR STRIP-ACNC: 0.2 EU/DL (ref 0.2–1)
UROBILINOGEN UR STRIP-MCNC: NORMAL MG/DL (ref 0–2)
WBC # BLD AUTO: 5.9 10E9/L (ref 4–11)

## 2017-11-18 PROCEDURE — 81003 URINALYSIS AUTO W/O SCOPE: CPT | Performed by: FAMILY MEDICINE

## 2017-11-18 PROCEDURE — 74177 CT ABD & PELVIS W/CONTRAST: CPT

## 2017-11-18 PROCEDURE — 85025 COMPLETE CBC W/AUTO DIFF WBC: CPT | Performed by: EMERGENCY MEDICINE

## 2017-11-18 PROCEDURE — 25000125 ZZHC RX 250: Performed by: EMERGENCY MEDICINE

## 2017-11-18 PROCEDURE — 81003 URINALYSIS AUTO W/O SCOPE: CPT | Performed by: EMERGENCY MEDICINE

## 2017-11-18 PROCEDURE — 25000128 H RX IP 250 OP 636: Performed by: EMERGENCY MEDICINE

## 2017-11-18 PROCEDURE — 99284 EMERGENCY DEPT VISIT MOD MDM: CPT | Mod: Z6 | Performed by: EMERGENCY MEDICINE

## 2017-11-18 PROCEDURE — 99213 OFFICE O/P EST LOW 20 MIN: CPT | Performed by: FAMILY MEDICINE

## 2017-11-18 PROCEDURE — 81025 URINE PREGNANCY TEST: CPT | Performed by: EMERGENCY MEDICINE

## 2017-11-18 PROCEDURE — 99285 EMERGENCY DEPT VISIT HI MDM: CPT | Mod: 25 | Performed by: EMERGENCY MEDICINE

## 2017-11-18 PROCEDURE — 80048 BASIC METABOLIC PNL TOTAL CA: CPT | Performed by: EMERGENCY MEDICINE

## 2017-11-18 RX ORDER — IOPAMIDOL 755 MG/ML
100 INJECTION, SOLUTION INTRAVASCULAR ONCE
Status: COMPLETED | OUTPATIENT
Start: 2017-11-18 | End: 2017-11-18

## 2017-11-18 RX ADMIN — IOPAMIDOL 100 ML: 755 INJECTION, SOLUTION INTRAVENOUS at 13:00

## 2017-11-18 RX ADMIN — SODIUM CHLORIDE 65 ML: 9 INJECTION, SOLUTION INTRAVENOUS at 13:00

## 2017-11-18 ASSESSMENT — ENCOUNTER SYMPTOMS
EYES NEGATIVE: 1
HEMATOLOGIC/LYMPHATIC NEGATIVE: 1
ENDOCRINE NEGATIVE: 1
FLANK PAIN: 1
CARDIOVASCULAR NEGATIVE: 1
NEUROLOGICAL NEGATIVE: 1
CONSTITUTIONAL NEGATIVE: 1
ABDOMINAL PAIN: 1
PSYCHIATRIC NEGATIVE: 1
RESPIRATORY NEGATIVE: 1
ALLERGIC/IMMUNOLOGIC NEGATIVE: 1

## 2017-11-18 NOTE — ED AVS SNAPSHOT
[facility]    [address] [address]    Phone:  [phone]    Fax:  [phone]                                       Keely Godinez   MRN: [id number]    Department:  [facility]   Date of Visit:  11/18/2017           After Visit Summary Signature Page     I have received my discharge instructions, and my questions have been answered. I have discussed any challenges I see with this plan with the nurse or doctor.    ..........................................................................................................................................  Patient/Patient Representative Signature      ..........................................................................................................................................  Patient Representative Print Name and Relationship to Patient    ..................................................               ................................................  Date                                            Time    ..........................................................................................................................................  Reviewed by Signature/Title    ...................................................              ..............................................  Date                                                            Time

## 2017-11-18 NOTE — MR AVS SNAPSHOT
After Visit Summary   11/18/2017    Keely Godinez    MRN: [id number]           Patient Information     Date Of Birth          1980        Visit Information        Provider Department      11/18/2017 10:00 AM Ryan Alvarenga MD [facility]        Today's Diagnoses     Lower abdominal pain    -  1    Dysuria        H/O renal calculi           Follow-ups after your visit        Who to contact     If you have questions or need follow up information about today's clinic visit or your schedule please contact [facility] directly at [phone].  Normal or non-critical lab and imaging results will be communicated to you by Productifyhart, letter or phone within 4 business days after the clinic has received the results. If you do not hear from us within 7 days, please contact the clinic through Pivot3t or phone. If you have a critical or abnormal lab result, we will notify you by phone as soon as possible.  Submit refill requests through Make YES! Happen or call your pharmacy and they will forward the refill request to us. Please allow 3 business days for your refill to be completed.          Additional Information About Your Visit        MyChart Information     Make YES! Happen gives you secure access to your electronic health record. If you see a primary care provider, you can also send messages to your care team and make appointments. If you have questions, please call your primary care clinic.  If you do not have a primary care provider, please call [phone] and they will assist you.        Care EveryWhere ID     This is your Care EveryWhere ID. This could be used by other organizations to access your [facility] medical records  [id number]        Your Vitals Were     Pulse Temperature Pulse Oximetry BMI (Body Mass Index)          78 97.5  F (36.4  C) (Tympanic) 96% 35.81 kg/m2         Blood Pressure from Last 3 Encounters:   11/18/17 (!) 146/96   11/18/17  130/81   08/21/17 120/80    Weight from Last 3 Encounters:   11/18/17 208 lb (94.3 kg)   11/18/17 208 lb 9.6 oz (94.6 kg)   08/21/17 206 lb (93.4 kg)              We Performed the Following     *UA reflex to Microscopic and Culture ([place] and [facility] (except [place] and [place])        Primary Care Provider Office Phone # Fax #    [facility] [phone] [address] [address]        Equal Access to Services     [facility]: Hadii aad ku hadasho Soomaali, waaxda luqadaha, qaybta kaalmada adeegyajesus, tommy will . So [facility] [phone].    ATENCIÓN: Si habla español, tiene a eason disposición servicios gratuitos de asistencia lingüística. Olena al [phone].    We comply with applicable federal civil rights laws and [place] laws. We do not discriminate on the basis of race, color, national origin, age, disability, sex, sexual orientation, or gender identity.            Thank you!     Thank you for choosing [facility]  for your care. Our goal is always to provide you with excellent care. Hearing back from our patients is one way we can continue to improve our services. Please take a few minutes to complete the written survey that you may receive in the mail after your visit with us. Thank you!             Your Updated Medication List - Protect others around you: Learn how to safely use, store and throw away your medicines at www.disposemymeds.org.          This list is accurate as of: 11/18/17 11:56 AM.  Always use your most recent med list.                   Brand Name Dispense Instructions for use Diagnosis    cetirizine 10 MG tablet    zyrTEC    30 tablet    Take 1 tablet (10 mg) by mouth every evening    Acute bronchitis with symptoms > 10 days       IBUPROFEN PO      Take 600 mg by mouth as needed        OMEPRAZOLE PO      Take 20 mg by mouth as needed        varenicline 0.5 MG X 11 & 1  MG X 42 tablet    CHANTIX ROLANDO     Take 1 tablet by mouth 2 times daily

## 2017-11-18 NOTE — ED PROVIDER NOTES
History     Chief Complaint   Patient presents with     Abdominal Pain     off/on 2 weeks     HPI  Keely Godinez is a 37 year old female with a history of obstructive sleep apnea, insomnia who presents for intermittent abdominal pain over the last 2 weeks.  No fever, no urinary symptoms.  History of kidney stone.  She passed a kidney stone 9 days ago.  She reports she takes no active medications except for chantix and Percocet.  She reports no fever no chills.  No vaginal bleeding or discharge.  She reports no ovarian cyst area given intermittent pain and discomfort in lower back and suprapubic region she arrives in the emergency room for care,     Problem List:    Patient Active Problem List    Diagnosis Date Noted     CORINA (obstructive sleep apnea)- mild (AHI 6) 07/06/2016     Priority: Medium     Study Date: 6/27/2016- (195.0 lbs) apnea/hypopnea index 6.1 events per hour.  REM AHI 18.8 events per hour.  The supine AHI 13.0 events per hour.   RDI 6.7 events per hour. Lowest oxygen saturation 89%.         Persistent insomnia 06/23/2016     Priority: Medium     Non morbid obesity due to excess calories 06/06/2016     Priority: Medium     ASCUS of cervix with negative high risk HPV 05/26/2016     Priority: Medium     5/26/16 ASCUS/neg HR HPV. Plan: cotest in 3 years.        Tobacco use disorder 07/20/2015     Priority: Medium        Past Medical History:    Past Medical History:   Diagnosis Date     ASCUS of cervix with negative high risk HPV 5/26/16     Uncomplicated asthma        Past Surgical History:    Past Surgical History:   Procedure Laterality Date     NO HISTORY OF SURGERY         Family History:    Family History   Problem Relation Age of Onset     Sleep Apnea Mother      Obesity Mother      Suicide Father 37     Depression Father      Anxiety Disorder Father      Asthma Maternal Grandmother      Coronary Artery Disease Maternal Grandfather      Coronary Artery Disease Paternal Grandfather      Obesity  Brother      Sleep Apnea Brother        Social History:  Marital Status:  Single [1]  Social History   Substance Use Topics     Smoking status: Current Every Day Smoker     Packs/day: 0.75     Years: 23.00     Types: Cigarettes     Smokeless tobacco: Never Used      Comment: started age 13     Alcohol use 0.0 oz/week     0 Standard drinks or equivalent per week      Comment: socially        Medications:      varenicline (CHANTIX ROLANDO) 0.5 MG X 11 & 1 MG X 42 tablet   cetirizine (ZYRTEC) 10 MG tablet   OMEPRAZOLE PO   IBUPROFEN PO         Review of Systems   Constitutional: Negative.    HENT: Negative.    Eyes: Negative.    Respiratory: Negative.    Cardiovascular: Negative.    Gastrointestinal: Positive for abdominal pain.   Endocrine: Negative.    Genitourinary: Positive for flank pain.   Skin: Negative.    Allergic/Immunologic: Negative.    Neurological: Negative.    Hematological: Negative.    Psychiatric/Behavioral: Negative.        Physical Exam   BP: (!) 146/96  Pulse: 80  Heart Rate: 89  Temp: 98  F (36.7  C)  Resp: 16  Weight: 94.3 kg (208 lb)  SpO2: 96 %      Physical Exam   Constitutional: She is oriented to person, place, and time. She appears well-developed and well-nourished. No distress.   HENT:   Head: Normocephalic and atraumatic.   Eyes: Conjunctivae and EOM are normal. Pupils are equal, round, and reactive to light. Right eye exhibits no discharge. Left eye exhibits no discharge. No scleral icterus.   Neck: Normal range of motion. Neck supple. No JVD present. No tracheal deviation present. No thyromegaly present.   Cardiovascular: Normal rate and regular rhythm.    Pulmonary/Chest: Effort normal and breath sounds normal. No stridor. No respiratory distress. She has no wheezes. She has no rales. She exhibits no tenderness.   Abdominal: Soft. Bowel sounds are normal. She exhibits no distension and no mass. There is no tenderness. There is no rebound and no guarding.   Lymphadenopathy:     She has no  cervical adenopathy.   Neurological: She is alert and oriented to person, place, and time.   Skin: No rash noted. She is not diaphoretic. No erythema. No pallor.   Psychiatric: She has a normal mood and affect. Her behavior is normal. Judgment and thought content normal.       ED Course     ED Course     Procedures               Critical Care time:  none                 ED medications:  Medications   0.9% sodium chloride BOLUS (0 mLs Intravenous Hold 11/18/17 1525)   sodium chloride 0.9 % bag 500mL for CT scan flush use (65 mLs Intravenous Given 11/18/17 1300)   iopamidol (ISOVUE-370) solution 100 mL (100 mLs Intravenous Given 11/18/17 1300)     ED labs and imaging:  Results for orders placed or performed during the hospital encounter of 11/18/17   CT Abdomen Pelvis w Contrast    Narrative    Exam: CT ABDOMEN PELVIS W CONTRAST  11/18/2017 1:07 PM    History: Lower abdominal pain, intermittently for 2 weeks. History of  kidney stones.    Comparison: None    Technique: Volumetric acquisition with reconstruction in the axial,  coronal planes through the abdomen and pelvis with contrast. Radiation  dose for this scan was reduced using automated exposure control,  adjustment of the mA and/or kV according to patient size, or iterative  reconstruction technique.    Contrast: 100 ml isovue 370    Findings:   Lung Bases: Lung bases are clear. No pleural or pericardial effusion.    Abdomen: Hepatic steatosis. 2 cm arterially enhancing lesion is seen  in hepatic segment 2 (series 2 image 11 and series 3 image 50). Liver  is otherwise normal. Spleen, adrenal glands, kidneys, pancreas and  gallbladder appear normal.    No areas of bowel wall thickening or bowel dilatation. Normal  appendix. No free fluid. Pelvic structures appear normal with corpus  luteum in the left ovary. No abdominal or pelvic lymphadenopathy.    Bones: No concerning lytic or sclerotic lesions.      Impression    Impression:   1. 2 cm arterially enhancing  lesion superiorly in hepatic segment 2.  This is incompletely characterized on this single phase study.  Recommend further characterization with liver mass protocol MRI on a  nonemergent basis.  2. Hepatic steatosis.    REINIER KINNEY MD   UA reflex to Microscopic   Result Value Ref Range    Color Urine Straw     Appearance Urine Clear     Glucose Urine Negative NEG^Negative mg/dL    Bilirubin Urine Negative NEG^Negative    Ketones Urine Negative NEG^Negative mg/dL    Specific Gravity Urine 1.005 1.003 - 1.035    Blood Urine Negative NEG^Negative    pH Urine 5.5 5.0 - 7.0 pH    Protein Albumin Urine Negative NEG^Negative mg/dL    Urobilinogen mg/dL Normal 0.0 - 2.0 mg/dL    Nitrite Urine Negative NEG^Negative    Leukocyte Esterase Urine Negative NEG^Negative    Source Midstream Urine    HCG qualitative urine   Result Value Ref Range    HCG Qual Urine Negative NEG^Negative   CBC with platelets differential   Result Value Ref Range    WBC 5.9 4.0 - 11.0 10e9/L    RBC Count 4.80 3.8 - 5.2 10e12/L    Hemoglobin 14.7 11.7 - 15.7 g/dL    Hematocrit 43.0 35.0 - 47.0 %    MCV 90 78 - 100 fl    MCH 30.6 26.5 - 33.0 pg    MCHC 34.2 31.5 - 36.5 g/dL    RDW 12.6 10.0 - 15.0 %    Platelet Count 166 150 - 450 10e9/L    Diff Method Automated Method     % Neutrophils 58.2 %    % Lymphocytes 32.0 %    % Monocytes 6.3 %    % Eosinophils 3.1 %    % Basophils 0.2 %    % Immature Granulocytes 0.2 %    Absolute Neutrophil 3.4 1.6 - 8.3 10e9/L    Absolute Lymphocytes 1.9 0.8 - 5.3 10e9/L    Absolute Monocytes 0.4 0.0 - 1.3 10e9/L    Absolute Eosinophils 0.2 0.0 - 0.7 10e9/L    Absolute Basophils 0.0 0.0 - 0.2 10e9/L    Abs Immature Granulocytes 0.0 0 - 0.4 10e9/L   Basic metabolic panel   Result Value Ref Range    Sodium 137 133 - 144 mmol/L    Potassium 3.8 3.4 - 5.3 mmol/L    Chloride 106 94 - 109 mmol/L    Carbon Dioxide 24 20 - 32 mmol/L    Anion Gap 7 3 - 14 mmol/L    Glucose 97 70 - 99 mg/dL    Urea Nitrogen 17 7 - 30 mg/dL     Creatinine 0.83 0.52 - 1.04 mg/dL    GFR Estimate 76 >60 mL/min/1.7m2    GFR Estimate If Black >90 >60 mL/min/1.7m2    Calcium 9.0 8.5 - 10.1 mg/dL         ED Vitals:  Vitals:    11/18/17 1115 11/18/17 1523   BP: (!) 146/96 130/80   Pulse:  80   Resp: 16 18   Temp: 98  F (36.7  C)    TempSrc: Oral    SpO2: 96%    Weight: 94.3 kg (208 lb)      Assessments & Plan (with Medical Decision Making)   Clinical impression:  37-year-old female who presented for intermittently tent suprapubic and lower abdominal pain as well as bilateral lower flank pain and discomfort over the last 2-3 weeks. The exact cause of her symptoms are unclear. Incidental liver lesion noted on imaging, not likely related to symptoms. Patient reports she is currently on Chantix and omeprazole.  She does have a history of kidney stones.  She has also required urology intervention with stenting in distant past.  She feels she passed a kidney stone about 9 days ago when she urinated into a coffee filter bag and passed a small kidney stone.  Since passing the kidney stone she has reported ongoing pain and discomfort.  She reports it feels like she worked out and she did not work out.  She has no fever.  She reports no urinary symptoms specifically no dysuria, no urgency or frequency and she denies any vaginal bleeding or discharge.      ED Course and Plan:  Mid stream urinalysis is normal without any hematuria or pyuria.  Because she has had symptoms intermittently for 2 weeks I elected to obtain a CT with contrast with water and IV contrast for further care and evaluation.  CT did not show any acute process.  There is hepatic steatosis noted in the early enhancing lesion noted in the hepatic segment measuring about 2 cm.  A non-emergent MRI may be obtained for interval follow-up through reported PCP (Dr Ramirez- at [facility]).  I reviewed lab results, urinalysis as well as CT imaging results with the patient.  At this time the cause of her abdominal pain is  unclear.  Low concern for  related causes including torsion, ovarian cyst.  She may make a clinic appointment to establish primary care and to follow-up if she has persistent or symptoms.  We a discussed reasons for return to the emergency department for care.        Disclaimer: This note consists of symbols derived from keyboarding, dictation and/or voice recognition software. As a result, there may be errors in the script that have gone undetected. Please consider this when interpreting information found in this chart.  I have reviewed the nursing notes.    I have reviewed the findings, diagnosis, plan and need for follow up with the patient.       Discharge Medication List as of 11/18/2017  3:16 PM          Final diagnoses:   Abdominal pain, generalized - More pronounced in the and lower and suprapubic region.  Normal urinalysis   Bilateral flank pain - Unclear cause       11/18/2017   [facility] EMERGENCY DEPARTMENT     Manuel Gregorio MD  11/18/17 9986

## 2017-11-18 NOTE — ED AVS SNAPSHOT
[facility] Emergency Department    [address] [facility] [address]    Phone:  [phone]    Fax:  [phone]                                       Keely Godinez   MRN: [id number]    Department:  [facility] Emergency Department   Date of Visit:  11/18/2017           Patient Information     Date Of Birth          1980        Your diagnoses for this visit were:     Abdominal pain, generalized More pronounced in the and lower and suprapubic region.  Normal urinalysis    Bilateral flank pain Unclear cause       You were seen by Manuel Gregorio MD.      Follow-up Information     Follow up with Clinic, [facility] In 3 days.    Why:  As needed, If symptoms worsen. Consider non-emergent MRI abdomen for liver lesion or pelvic ultrasound to exclude cyst vs other lower pelvic process    Contact information:    [address] [facility] [id number] [phone]          Follow up with [facility] Emergency Department.    Specialty:  EMERGENCY MEDICINE    Why:  If fever, coughing or vomiting blood, bloody stools, or symptoms suggestive of a kidney stone    Contact information:    [address] [place] [address] [phone]    Additional information:    The medical center is located at   [address] [facility]. (between [address] and   [address] in [place], [place] miles north   of [place]).      Discharge References/Attachments     ABDOMINAL PAIN, UNKNOWN CAUSE, (FEMALE) (ENGLISH)      24 Hour Appointment Hotline       To make an appointment at any [facility], call [phone] ([phone]). If you don't have a family doctor or clinic, we will help you find one. [facility] are conveniently located to serve the needs of you and your family.             Review of your medicines      Our records show that you are taking the medicines listed below. If these are incorrect, please call your family doctor or clinic.        Dose / Directions Last dose taken     cetirizine 10 MG tablet   Commonly known as:  zyrTEC   Dose:  10 mg   Quantity:  30 tablet        Take 1 tablet (10 mg) by mouth every evening   Refills:  3        IBUPROFEN PO   Dose:  600 mg        Take 600 mg by mouth as needed   Refills:  0        OMEPRAZOLE PO   Dose:  20 mg        Take 20 mg by mouth as needed   Refills:  0        varenicline 0.5 MG X 11 & 1 MG X 42 tablet   Commonly known as:  CHANTIX ROLANDO   Dose:  1 tablet        Take 1 tablet by mouth 2 times daily   Refills:  0                Procedures and tests performed during your visit     Basic metabolic panel    CBC with platelets differential    CT Abdomen Pelvis w Contrast    HCG qualitative urine    UA reflex to Microscopic      Orders Needing Specimen Collection     None      Pending Results     No orders found from 11/16/2017 to 11/19/2017.            Pending Culture Results     No orders found from 11/16/2017 to 11/19/2017.            Pending Results Instructions     If you had any lab results that were not finalized at the time of your Discharge, you can call the ED Lab Result RN at [phone]. You will be contacted by this team for any positive Lab results or changes in treatment. The nurses are available 7 days a week from 10A to 6:30P.  You can leave a message 24 hours per day and they will return your call.        Test Results From Your Hospital Stay        11/18/2017 12:16 PM      Component Results     Component Value Ref Range & Units Status    Color Urine Straw  Final    Appearance Urine Clear  Final    Glucose Urine Negative NEG^Negative mg/dL Final    Bilirubin Urine Negative NEG^Negative Final    Ketones Urine Negative NEG^Negative mg/dL Final    Specific Gravity Urine 1.005 1.003 - 1.035 Final    Blood Urine Negative NEG^Negative Final    pH Urine 5.5 5.0 - 7.0 pH Final    Protein Albumin Urine Negative NEG^Negative mg/dL Final    Urobilinogen mg/dL Normal 0.0 - 2.0 mg/dL Final    Nitrite Urine Negative NEG^Negative Final     Leukocyte Esterase Urine Negative NEG^Negative Final    Source Midstream Urine  Final         11/18/2017 12:33 PM      Component Results     Component Value Ref Range & Units Status    HCG Qual Urine Negative NEG^Negative Final    This test is for screening purposes.  Results should be interpreted along with   the clinical picture.  Confirmation testing is available if warranted by   ordering [id number], HCG Quantitative Pregnancy.           11/18/2017 12:44 PM      Component Results     Component Value Ref Range & Units Status    WBC 5.9 4.0 - 11.0 10e9/L Final    RBC Count 4.80 3.8 - 5.2 10e12/L Final    Hemoglobin 14.7 11.7 - 15.7 g/dL Final    Hematocrit 43.0 35.0 - 47.0 % Final    MCV 90 78 - 100 fl Final    MCH 30.6 26.5 - 33.0 pg Final    MCHC 34.2 31.5 - 36.5 g/dL Final    RDW 12.6 10.0 - 15.0 % Final    Platelet Count 166 150 - 450 10e9/L Final    Diff Method Automated Method  Final    % Neutrophils 58.2 % Final    % Lymphocytes 32.0 % Final    % Monocytes 6.3 % Final    % Eosinophils 3.1 % Final    % Basophils 0.2 % Final    % Immature Granulocytes 0.2 % Final    Absolute Neutrophil 3.4 1.6 - 8.3 10e9/L Final    Absolute Lymphocytes 1.9 0.8 - 5.3 10e9/L Final    Absolute Monocytes 0.4 0.0 - 1.3 10e9/L Final    Absolute Eosinophils 0.2 0.0 - 0.7 10e9/L Final    Absolute Basophils 0.0 0.0 - 0.2 10e9/L Final    Abs Immature Granulocytes 0.0 0 - 0.4 10e9/L Final         11/18/2017  1:04 PM      Component Results     Component Value Ref Range & Units Status    Sodium 137 133 - 144 mmol/L Final    Potassium 3.8 3.4 - 5.3 mmol/L Final    Chloride 106 94 - 109 mmol/L Final    Carbon Dioxide 24 20 - 32 mmol/L Final    Anion Gap 7 3 - 14 mmol/L Final    Glucose 97 70 - 99 mg/dL Final    Urea Nitrogen 17 7 - 30 mg/dL Final    Creatinine 0.83 0.52 - 1.04 mg/dL Final    GFR Estimate 76 >60 mL/min/1.7m2 Final    Non  GFR Calc    GFR Estimate If Black >90 >60 mL/min/1.7m2 Final    African American GFR Calc     Calcium 9.0 8.5 - 10.1 mg/dL Final         11/18/2017  1:12 PM      Narrative     Exam: CT ABDOMEN PELVIS W CONTRAST  11/18/2017 1:07 PM    History: Lower abdominal pain, intermittently for 2 weeks. History of  kidney stones.    Comparison: None    Technique: Volumetric acquisition with reconstruction in the axial,  coronal planes through the abdomen and pelvis with contrast. Radiation  dose for this scan was reduced using automated exposure control,  adjustment of the mA and/or kV according to patient size, or iterative  reconstruction technique.    Contrast: 100 ml isovue 370    Findings:   Lung Bases: Lung bases are clear. No pleural or pericardial effusion.    Abdomen: Hepatic steatosis. 2 cm arterially enhancing lesion is seen  in hepatic segment 2 (series 2 image 11 and series 3 image 50). Liver  is otherwise normal. Spleen, adrenal glands, kidneys, pancreas and  gallbladder appear normal.    No areas of bowel wall thickening or bowel dilatation. Normal  appendix. No free fluid. Pelvic structures appear normal with corpus  luteum in the left ovary. No abdominal or pelvic lymphadenopathy.    Bones: No concerning lytic or sclerotic lesions.        Impression     Impression:   1. 2 cm arterially enhancing lesion superiorly in hepatic segment 2.  This is incompletely characterized on this single phase study.  Recommend further characterization with liver mass protocol MRI on a  nonemergent basis.  2. Hepatic steatosis.    REINIER KINNEY MD                Thank you for choosing [facility]       Thank you for choosing [facility] for your care. Our goal is always to provide you with excellent care. Hearing back from our patients is one way we can continue to improve our services. Please take a few minutes to complete the written survey that you may receive in the mail after you visit with us. Thank you!        [employer] Information     [employer] gives you secure access to your electronic health record. If you see a primary  care provider, you can also send messages to your care team and make appointments. If you have questions, please call your primary care clinic.  If you do not have a primary care provider, please call [phone] and they will assist you.        Care EveryWhere ID     This is your Care EveryWhere ID. This could be used by other organizations to access your [place] medical records  [id number]        Equal Access to Services     ROSA SANDERS : Thong Lynn, spike hillman, leona sandoval, tommy reich. So [facility] [phone].    ATENCIÓN: Si habla español, tiene a eason disposición servicios gratuitos de asistencia lingüística. Llame al [phone].    We comply with applicable federal civil rights laws and [place] laws. We do not discriminate on the basis of race, color, national origin, age, disability, sex, sexual orientation, or gender identity.            After Visit Summary       This is your record. Keep this with you and show to your community pharmacist(s) and doctor(s) at your next visit.

## 2017-11-18 NOTE — ED NOTES
"Pt here with abdominal pain, slight flank pain bilateral Hx of kidney stones \"  Think I passed a kidney stone last Thursday\" onset pain about 2 weeks ago, was sent here from clinic today. Pain low pelvic # 5 \"pressure\" no UTI symptoms, increased frequency and pressure.  "

## 2017-11-18 NOTE — PROGRESS NOTES
SUBJECTIVE:   Keely Godinez is a 37 year old female who presents to clinic today for the following health issues:      Abdominal Pain      Duration: 2 weeks     Description (location/character/radiation): starts in middle stomach, back pain       Associated flank pain: None     Intensity:  moderate    Accompanying signs and symptoms:        Fever/Chills: YES- chills        Gas/Bloating: YES       Nausea/vomitting: YES- nausea        Diarrhea: YES       Dysuria or Hematuria: YES- dysuria- pressure     History (previous similar pain/trauma/previous testing): none     Precipitating or alleviating factors:       Pain worse with eating/BM/urination: urinating        Pain relieved by BM: no     Therapies tried and outcome: None    LMP:  11/01/17     H/o kidney stones, had stents before, appetite is fair      Menstrual cycles are normal       Problem list and histories reviewed & adjusted, as indicated.  Additional history: as documented    Patient Active Problem List   Diagnosis     Tobacco use disorder     ASCUS of cervix with negative high risk HPV     Non morbid obesity due to excess calories     Persistent insomnia     CORINA (obstructive sleep apnea)- mild (AHI 6)     Past Surgical History:   Procedure Laterality Date     NO HISTORY OF SURGERY         Social History   Substance Use Topics     Smoking status: Current Every Day Smoker     Packs/day: 0.75     Years: 23.00     Types: Cigarettes     Smokeless tobacco: Never Used      Comment: started age 13     Alcohol use 0.0 oz/week     0 Standard drinks or equivalent per week      Comment: socially     Family History   Problem Relation Age of Onset     Sleep Apnea Mother      Obesity Mother      Suicide Father 37     Depression Father      Anxiety Disorder Father      Asthma Maternal Grandmother      Coronary Artery Disease Maternal Grandfather      Coronary Artery Disease Paternal Grandfather      Obesity Brother      Sleep Apnea Brother          Current Outpatient  "Prescriptions   Medication Sig Dispense Refill     varenicline (CHANTIX STARTING MONTH ROLANDO) 0.5 MG X 11 & 1 MG X 42 tablet Take 0.5 mg tab daily for 3 days, then 0.5 mg tab twice daily for 4 days, then 1 mg twice daily. 53 tablet 0     varenicline (CHANTIX) 1 MG tablet Take 1 tablet (1 mg) by mouth 2 times daily 56 tablet 2     cetirizine (ZYRTEC) 10 MG tablet Take 1 tablet (10 mg) by mouth every evening 30 tablet 3     OMEPRAZOLE PO Take 20 mg by mouth as needed       IBUPROFEN PO Take 600 mg by mouth as needed        amoxicillin (AMOXIL) 875 MG tablet Take 1 tablet (875 mg) by mouth 2 times daily 14 tablet 0     nicotine (NICODERM CQ) 21 MG/24HR 24 hr patch Place 1 patch onto the skin every 24 hours (Patient not taking: Reported on 11/18/2017) 30 patch 1     Allergies   Allergen Reactions     Fentanyl      Other reaction(s): Respiratory Depression     Midazolam      Other reaction(s): Respiratory Depression     No Clinical Screening - See Comments Difficulty breathing     She is unsure of name. Numbing medicine, starts with an \"s or C\".      Seasonal Allergies      Varenicline      assaultive ideation     Recent Labs   Lab Test  05/26/16   0954   LDL  70   HDL  62   TRIG  109   ALT  27   CR  0.79   GFRESTIMATED  81   GFRESTBLACK  >90   GFR Calc     POTASSIUM  4.0   TSH  0.39*      BP Readings from Last 3 Encounters:   11/18/17 130/81   08/21/17 120/80   02/03/17 104/60    Wt Readings from Last 3 Encounters:   11/18/17 208 lb 9.6 oz (94.6 kg)   08/21/17 206 lb (93.4 kg)   02/03/17 206 lb 1.6 oz (93.5 kg)                  Labs reviewed in EPIC          Reviewed and updated as needed this visit by clinical staffTobacco  Allergies  Meds  Med Hx  Surg Hx  Fam Hx  Soc Hx      Reviewed and updated as needed this visit by Provider         ROS:   ROS: 10 point ROS neg other than the symptoms noted above in the HPI.      OBJECTIVE:   /81  Pulse 78  Temp 97.5  F (36.4  C) (Tympanic)  Wt 208 lb " 9.6 oz (94.6 kg)  SpO2 96%  BMI 35.81 kg/m2  Body mass index is 35.81 kg/(m^2).  GENERAL: healthy, alert and no distress  NECK: no adenopathy, no asymmetry, masses, or scars and thyroid normal to palpation  RESP: lungs clear to auscultation - no rales, rhonchi or wheezes  CV: regular rate and rhythm, normal S1 S2, no S3 or S4, no murmur, click or rub, no peripheral edema and peripheral pulses strong  ABDOMEN: soft, moderate to severe right lower abdominal tenderness, bowel sounds normal  MS: no gross musculoskeletal defects noted, no edema    Diagnostic Test Results:  Results for orders placed or performed in visit on 11/18/17 (from the past 24 hour(s))   *UA reflex to Microscopic and Culture ([place] [facility] (except [place] and [place])   Result Value Ref Range    Color Urine Yellow     Appearance Urine Clear     Glucose Urine Negative NEG^Negative mg/dL    Bilirubin Urine Negative NEG^Negative    Ketones Urine Negative NEG^Negative mg/dL    Specific Gravity Urine 1.015 1.003 - 1.035    Blood Urine Negative NEG^Negative    pH Urine 5.5 5.0 - 7.0 pH    Protein Albumin Urine Negative NEG^Negative mg/dL    Urobilinogen Urine 0.2 0.2 - 1.0 EU/dL    Nitrite Urine Negative NEG^Negative    Leukocyte Esterase Urine Negative NEG^Negative    Source Midstream Urine        ASSESSMENT/PLAN:         ICD-10-CM    1. Lower abdominal pain R10.30    2. Dysuria R30.0 *UA reflex to Microscopic and Culture ([place] and [facility] (except [place] and [place])   3. H/O renal calculi Z87.442         37 year old female presents with lower abdominal pain and dysuria. H/O renal calculi. Differentials are broad including but not limited to appendicitis, renal calculi and ovarian pathology. UA findings as above. Needs further evaluation for delineating specific etiology. Shared decision was made for ER transfer. All questions answered.       Ryan Alvarenga MD  [facility] URGENT CARE

## 2017-11-22 ENCOUNTER — OFFICE VISIT (OUTPATIENT)
Dept: FAMILY MEDICINE | Facility: CLINIC | Age: 37
End: 2017-11-22
Payer: COMMERCIAL

## 2017-11-22 VITALS
BODY MASS INDEX: 36.23 KG/M2 | HEART RATE: 84 BPM | DIASTOLIC BLOOD PRESSURE: 83 MMHG | TEMPERATURE: 97.4 F | WEIGHT: 212.2 LBS | SYSTOLIC BLOOD PRESSURE: 138 MMHG | OXYGEN SATURATION: 97 % | HEIGHT: 64 IN

## 2017-11-22 DIAGNOSIS — Z23 NEED FOR PROPHYLACTIC VACCINATION AND INOCULATION AGAINST INFLUENZA: ICD-10-CM

## 2017-11-22 DIAGNOSIS — R16.0 LIVER MASS: Primary | ICD-10-CM

## 2017-11-22 PROCEDURE — 90471 IMMUNIZATION ADMIN: CPT | Performed by: INTERNAL MEDICINE

## 2017-11-22 PROCEDURE — 90686 IIV4 VACC NO PRSV 0.5 ML IM: CPT | Performed by: INTERNAL MEDICINE

## 2017-11-22 PROCEDURE — 99213 OFFICE O/P EST LOW 20 MIN: CPT | Mod: 25 | Performed by: INTERNAL MEDICINE

## 2017-11-22 NOTE — MR AVS SNAPSHOT
After Visit Summary   11/22/2017    Keely Godinez    MRN: [id number]           Patient Information     Date Of Birth          1980        Visit Information        Provider Department      11/22/2017 11:20 AM Kingston Novoa MD [facility]        Today's Diagnoses     Need for prophylactic vaccination and inoculation against influenza    -  1    Liver mass           Follow-ups after your visit        Future tests that were ordered for you today     Open Future Orders        Priority Expected Expires Ordered    MR Abdomen w/o & w Contrast Routine  11/22/2018 11/22/2017            Who to contact     If you have questions or need follow up information about today's clinic visit or your schedule please contact [facility] directly at [phone].  Normal or non-critical lab and imaging results will be communicated to you by [employer], letter or phone within 4 business days after the clinic has received the results. If you do not hear from us within 7 days, please contact the clinic through [employer] or phone. If you have a critical or abnormal lab result, we will notify you by phone as soon as possible.  Submit refill requests through [employer] or call your pharmacy and they will forward the refill request to us. Please allow 3 business days for your refill to be completed.          Additional Information About Your Visit        MyChart Information     [employer] gives you secure access to your electronic health record. If you see a primary care provider, you can also send messages to your care team and make appointments. If you have questions, please call your primary care clinic.  If you do not have a primary care provider, please call [phone] and they will assist you.        Care EveryWhere ID     This is your Care EveryWhere ID. This could be used by other organizations to access your [place] medical records  [id number]        Your Vitals Were     Pulse Temperature Height  "Pulse Oximetry BMI (Body Mass Index)       84 97.4  F (36.3  C) (Tympanic) 5' 4\" (1.626 m) 97% 36.42 kg/m2        Blood Pressure from Last 3 Encounters:   11/22/17 138/83   11/18/17 130/80   11/18/17 130/81    Weight from Last 3 Encounters:   11/22/17 212 lb 3.2 oz (96.3 kg)   11/18/17 208 lb (94.3 kg)   11/18/17 208 lb 9.6 oz (94.6 kg)              We Performed the Following     ADMIN INFLUENZA (For MEDICARE Patients ONLY) []     FLU VAC, SPLIT VIRUS IM > 3 YO (QUADRIVALENT) [[id number]]        Primary Care Provider Office Phone # Fax #    [facility] [phone] [address] [facility] [address]        Equal Access to Services     ROSA SANDERS : Hadii kip adamso Socornelio, waaxda luqadaha, qaybta kaalmada adebhanuyada, tommy will . So [facility] [phone].    ATENCIÓN: Si habla español, tiene a eason disposición servicios gratuitos de asistencia lingüística. Llame al [phone].    We comply with applicable federal civil rights laws and [place] laws. We do not discriminate on the basis of race, color, national origin, age, disability, sex, sexual orientation, or gender identity.            Thank you!     Thank you for choosing [facility]  for your care. Our goal is always to provide you with excellent care. Hearing back from our patients is one way we can continue to improve our services. Please take a few minutes to complete the written survey that you may receive in the mail after your visit with us. Thank you!             Your Updated Medication List - Protect others around you: Learn how to safely use, store and throw away your medicines at www.disposemymeds.org.          This list is accurate as of: 11/22/17 11:47 AM.  Always use your most recent med list.                   Brand Name Dispense Instructions for use Diagnosis    cetirizine 10 MG tablet    zyrTEC    30 tablet    Take 1 tablet (10 mg) by mouth every evening    Acute " bronchitis with symptoms > 10 days       IBUPROFEN PO      Take 600 mg by mouth as needed        OMEPRAZOLE PO      Take 20 mg by mouth as needed        varenicline 0.5 MG X 11 & 1 MG X 42 tablet    CHANTIX ROLANDO     Take 1 tablet by mouth 2 times daily

## 2017-11-22 NOTE — NURSING NOTE
"Chief Complaint   Patient presents with     ER F/U     seen SINA Cam on 11/18/17, abdominal pain     Imm/Inj     flu vaccine        Initial /83 (BP Location: Right arm, Patient Position: Chair, Cuff Size: Adult Regular)  Pulse 84  Temp 97.4  F (36.3  C) (Tympanic)  Ht 5' 4\" (1.626 m)  Wt 212 lb 3.2 oz (96.3 kg)  SpO2 97%  BMI 36.42 kg/m2 Estimated body mass index is 36.42 kg/(m^2) as calculated from the following:    Height as of this encounter: 5' 4\" (1.626 m).    Weight as of this encounter: 212 lb 3.2 oz (96.3 kg).  Medication Reconciliation: complete   Rena KIRK CMA (AAMA)    "

## 2017-11-22 NOTE — PROGRESS NOTES
SUBJECTIVE:   Keely Godinez is a 37 year old female who presents to clinic today for the following health issues:  Chief Complaint   Patient presents with     ER F/U     seen [facility] on 11/18/17, abdominal pain     Imm/Inj     flu vaccine      ED/UC Followup:    Facility:  [facility]   Date of visit: 11/18/17  Reason for visit: abdominal pain  Current Status: pain/pressure is gone.  PATIENT was advised to follow up in clinic, spot found on liver during CT scan ordered in the ER.  PATIENT reports that paternal grandfather had spots on liver, does not know if it was liver cancer or not.       Keely was seen in the ED on 11/18 for lower abdominal pain.  Labs were normal and CT did not show any cause for pain.  CT did show a liver mass and MRI is recommended for follow-up.  Keely's symptoms have much improved.  Still some mild lower pain, but no fevers, chills, nausea, constipation, diarrhea, blood in stool, or urinary symptoms.      Problem list and histories reviewed & adjusted, as indicated.  Additional history: as documented    Patient Active Problem List   Diagnosis     Tobacco use disorder     ASCUS of cervix with negative high risk HPV     Non morbid obesity due to excess calories     Persistent insomnia     CORINA (obstructive sleep apnea)- mild (AHI 6)     Past Surgical History:   Procedure Laterality Date     NO HISTORY OF SURGERY         Social History   Substance Use Topics     Smoking status: Current Every Day Smoker     Packs/day: 0.75     Years: 23.00     Types: Cigarettes     Smokeless tobacco: Never Used      Comment: started age 13     Alcohol use 0.0 oz/week     0 Standard drinks or equivalent per week      Comment: socially     Family History   Problem Relation Age of Onset     Sleep Apnea Mother      Obesity Mother      Suicide Father 37     Depression Father      Anxiety Disorder Father      Asthma Maternal Grandmother      Coronary Artery Disease Maternal Grandfather      Coronary Artery Disease  "Paternal Grandfather      Obesity Brother      Sleep Apnea Brother          Current Outpatient Prescriptions   Medication Sig Dispense Refill     varenicline (CHANTIX ROLANDO) 0.5 MG X 11 & 1 MG X 42 tablet Take 1 tablet by mouth 2 times daily       OMEPRAZOLE PO Take 20 mg by mouth as needed       cetirizine (ZYRTEC) 10 MG tablet Take 1 tablet (10 mg) by mouth every evening 30 tablet 3     IBUPROFEN PO Take 600 mg by mouth as needed        Allergies   Allergen Reactions     Fentanyl      Other reaction(s): Respiratory Depression     Midazolam      Other reaction(s): Respiratory Depression     No Clinical Screening - See Comments Difficulty breathing     She is unsure of name. Numbing medicine, starts with an \"s or C\".      Seasonal Allergies      Varenicline      assaultive ideation         Reviewed and updated as needed this visit by clinical staffTobacco  Allergies  Med Hx  Surg Hx  Fam Hx  Soc Hx      Reviewed and updated as needed this visit by Provider         ROS:  Constitutional, gi and gu systems are negative, except as otherwise noted.      OBJECTIVE:   /83 (BP Location: Right arm, Patient Position: Chair, Cuff Size: Adult Regular)  Pulse 84  Temp 97.4  F (36.3  C) (Tympanic)  Ht 5' 4\" (1.626 m)  Wt 212 lb 3.2 oz (96.3 kg)  SpO2 97%  BMI 36.42 kg/m2  Body mass index is 36.42 kg/(m^2).    GENERAL: healthy, alert and no distress  RESP: lungs clear to auscultation - no rales, rhonchi or wheezes  CV: regular rate and rhythm, normal S1 S2, no S3 or S4, no murmur, click or rub  ABDOMEN: soft, mild pelvic tenderness, no rebound or guarding, no hepatosplenomegaly, no masses and bowel sounds normal      ASSESSMENT/PLAN:       1. Liver mass    Abdominal symptoms are of unclear etiology but have much improved, so no further work-up needed at this time.  Will get MRI for further evaluation of incidental liver mass seen on prior CT.      - MR Abdomen w/o & w Contrast; Future    2. Need for prophylactic " vaccination and inoculation against influenza    - FLU VAC, SPLIT VIRUS IM > 3 YO (QUADRIVALENT) [61527]  - ADMIN INFLUENZA (For MEDICARE Patients ONLY) []    Kingston Novoa MD  [facility]    Injectable Influenza Immunization Documentation    1.  Is the person to be vaccinated sick today?   No    2. Does the person to be vaccinated have an allergy to a component   of the vaccine?   No  Egg Allergy Algorithm Link    3. Has the person to be vaccinated ever had a serious reaction   to influenza vaccine in the past?   No    4. Has the person to be vaccinated ever had Guillain-Barré syndrome?   No    Form completed by Rena KIRK CMA ([facility])

## 2017-11-30 ENCOUNTER — HOSPITAL ENCOUNTER (OUTPATIENT)
Dept: MRI IMAGING | Facility: CLINIC | Age: 37
Discharge: HOME | End: 2017-11-30
Attending: INTERNAL MEDICINE | Admitting: INTERNAL MEDICINE
Payer: COMMERCIAL

## 2017-11-30 DIAGNOSIS — R16.0 LIVER MASS: ICD-10-CM

## 2017-11-30 PROCEDURE — 74183 MRI ABD W/O CNTR FLWD CNTR: CPT

## 2017-11-30 PROCEDURE — 25000128 H RX IP 250 OP 636: Performed by: INTERNAL MEDICINE

## 2017-11-30 PROCEDURE — A9585 GADOBUTROL INJECTION: HCPCS | Performed by: INTERNAL MEDICINE

## 2017-11-30 PROCEDURE — 27210995 ZZH RX 272: Performed by: INTERNAL MEDICINE

## 2017-11-30 RX ORDER — GADOBUTROL 604.72 MG/ML
7 INJECTION INTRAVENOUS ONCE
Status: COMPLETED | OUTPATIENT
Start: 2017-11-30 | End: 2017-11-30

## 2017-11-30 RX ORDER — ACYCLOVIR 200 MG/1
60 CAPSULE ORAL ONCE
Status: COMPLETED | OUTPATIENT
Start: 2017-11-30 | End: 2017-11-30

## 2017-11-30 RX ADMIN — GADOBUTROL 7 ML: 604.72 INJECTION INTRAVENOUS at 08:41

## 2017-11-30 RX ADMIN — SODIUM CHLORIDE 60 ML: 9 INJECTION, SOLUTION INTRAMUSCULAR; INTRAVENOUS; SUBCUTANEOUS at 08:41

## 2018-04-16 ENCOUNTER — OFFICE VISIT (OUTPATIENT)
Dept: FAMILY MEDICINE | Facility: CLINIC | Age: 38
End: 2018-04-16
Payer: COMMERCIAL

## 2018-04-16 ENCOUNTER — TELEPHONE (OUTPATIENT)
Dept: FAMILY MEDICINE | Facility: CLINIC | Age: 38
End: 2018-04-16

## 2018-04-16 ENCOUNTER — NURSE TRIAGE (OUTPATIENT)
Dept: NURSING | Facility: CLINIC | Age: 38
End: 2018-04-16

## 2018-04-16 VITALS
DIASTOLIC BLOOD PRESSURE: 82 MMHG | WEIGHT: 211.2 LBS | RESPIRATION RATE: 18 BRPM | HEIGHT: 64 IN | BODY MASS INDEX: 36.06 KG/M2 | SYSTOLIC BLOOD PRESSURE: 126 MMHG | HEART RATE: 76 BPM | TEMPERATURE: 98.2 F

## 2018-04-16 DIAGNOSIS — G44.219 EPISODIC TENSION-TYPE HEADACHE, NOT INTRACTABLE: Primary | ICD-10-CM

## 2018-04-16 PROCEDURE — 99213 OFFICE O/P EST LOW 20 MIN: CPT | Performed by: NURSE PRACTITIONER

## 2018-04-16 ASSESSMENT — PAIN SCALES - GENERAL: PAINLEVEL: SEVERE PAIN (6)

## 2018-04-16 NOTE — TELEPHONE ENCOUNTER
Clinic Action Needed:Yes  Reason for Call: Patient is waiting at [facility] pharmacy in [place] for her Flexeril prescription.  Routed to:  Team Marlon Triage  Nery Godfrey RN  [facility]

## 2018-04-16 NOTE — MR AVS SNAPSHOT
After Visit Summary   4/16/2018    Keely Godinez    MRN: [id number]           Patient Information     Date Of Birth          1980        Visit Information        Provider Department      4/16/2018 3:00 PM Kellie Pradhan APRN [facility] Instructions    Do the neck exercises,   Massage Asper cream  2-4 times per day     Keep your shoulders back    Bring the up , roll them back and drop.   This should bring the chin in     Do NOT bring our head forward        Ice or heat to the back of the neck     Do the stretch ,  Resist  Stretch exercise.,       Take a Flexeril in the evening this will help to relax the muscles and it makes you tired.       Keep a regular eating , sleeping and exercise pattern.     Try  2 EX Tylenol plus and Advil gel .  You can do this  3-4 times per day for  3-4 days               Follow-ups after your visit        Who to contact     Normal or non-critical lab and imaging results will be communicated to you by exsulint, letter or phone within 4 business days after the clinic has received the results. If you do not hear from us within 7 days, please contact the clinic through exsulint or phone. If you have a critical or abnormal lab result, we will notify you by phone as soon as possible.  Submit refill requests through MyApp[employer] or call your pharmacy and they will forward the refill request to us. Please allow 3 business days for your refill to be completed.          If you need to speak with a  for additional information , please call: [phone]           Additional Information About Your Visit        MyAppConverter Information     My[employer] gives you secure access to your electronic health record. If you see a primary care provider, you can also send messages to your care team and make appointments. If you have questions, please call your primary care clinic.  If you do not have a primary care provider, please call [phone]  "and they will assist you.        Care EveryWhere ID     This is your Care EveryWhere ID. This could be used by other organizations to access your [place] medical records  [id number]        Your Vitals Were     Pulse Temperature Respirations Height Last Period Breastfeeding?    76 98.2  F (36.8  C) (Tympanic) 18 5' 4\" (1.626 m) 04/14/2018 (Exact Date) No    BMI (Body Mass Index)                   36.25 kg/m2            Blood Pressure from Last 3 Encounters:   04/16/18 126/82   11/22/17 138/83   11/18/17 130/80    Weight from Last 3 Encounters:   04/16/18 211 lb 3.2 oz (95.8 kg)   11/22/17 212 lb 3.2 oz (96.3 kg)   11/18/17 208 lb (94.3 kg)              Today, you had the following     No orders found for display       Primary Care Provider Office Phone # Fax #    [facility] [phone]       [address] [place] [id number]        Equal Access to Services     ROSA SANDERS : Hadii kip ku hadasho Soomaali, waaxda luqadaha, qaybta kaalmada adeegyada, tommy will . So [facility] [phone].    ATENCIÓN: Si habla español, tiene a eason disposición servicios gratuitos de asistencia lingüística. Llame al [phone].    We comply with applicable federal civil rights laws and [place] laws. We do not discriminate on the basis of race, color, national origin, age, disability, sex, sexual orientation, or gender identity.            Thank you!     Thank you for choosing [facility]  for your care. Our goal is always to provide you with excellent care. Hearing back from our patients is one way we can continue to improve our services. Please take a few minutes to complete the written survey that you may receive in the mail after your visit with us. Thank you!             Your Updated Medication List - Protect others around you: Learn how to safely use, store and throw away your medicines at www.disposemymeds.org.          This list is accurate as of " 4/16/18  4:15 PM.  Always use your most recent med list.                   Brand Name Dispense Instructions for use Diagnosis    cetirizine 10 MG tablet    zyrTEC    30 tablet    Take 1 tablet (10 mg) by mouth every evening    Acute bronchitis with symptoms > 10 days       IBUPROFEN PO      Take 600 mg by mouth as needed        OMEPRAZOLE PO      Take 20 mg by mouth as needed        varenicline 0.5 MG X 11 & 1 MG X 42 tablet    CHANTIX ROLANDO     Take 1 tablet by mouth 2 times daily

## 2018-04-16 NOTE — NURSING NOTE
"Chief Complaint   Patient presents with     Headache       Initial /82 (BP Location: Left arm, Patient Position: Chair, Cuff Size: Adult Large)  Pulse 76  Temp 98.2  F (36.8  C) (Tympanic)  Resp 18  Ht 5' 4\" (1.626 m)  Wt 211 lb 3.2 oz (95.8 kg)  LMP 04/14/2018 (Exact Date)  Breastfeeding? No  BMI 36.25 kg/m2 Estimated body mass index is 36.25 kg/(m^2) as calculated from the following:    Height as of this encounter: 5' 4\" (1.626 m).    Weight as of this encounter: 211 lb 3.2 oz (95.8 kg).  Medication Reconciliation: complete     Reyna Chisholm CMA (AAMA)      "

## 2018-04-16 NOTE — TELEPHONE ENCOUNTER
Reason for Disposition    [1] Prescription not at pharmacy AND [2] was prescribed today by PCP    Protocols used: MEDICATION QUESTION CALL-ADULT-  Message red flag routed to provider.  Nery Godfrey RN  [facility]

## 2018-04-16 NOTE — PATIENT INSTRUCTIONS
Do the neck exercises,   Massage Asper cream  2-4 times per day     Keep your shoulders back    Bring the up , roll them back and drop.   This should bring the chin in     Do NOT bring our head forward        Ice or heat to the back of the neck     Do the stretch ,  Resist  Stretch exercise.,       Take a Flexeril in the evening this will help to relax the muscles and it makes you tired.       Keep a regular eating , sleeping and exercise pattern.     Try  2 EX Tylenol plus and Advil gel .  You can do this  3-4 times per day for  3-4 days

## 2018-04-16 NOTE — PROGRESS NOTES
SUBJECTIVE:   Keely Godinez is a 37 year old female who presents to clinic today for the following health issues:      Headache  Onset: At least 2 weeks ago    Description:   Location: bilateral in the occipital area, did seem to be more on the left side this morning   Character: throbbing pain  Frequency:  Daily  Duration:  All day    Intensity: moderate, severe    Progression of Symptoms:  same    Accompanying Signs & Symptoms:  Stiff neck: no  Neck or upper back pain: no  Fever: no  Sinus pressure: no  Nausea or vomiting: YES- nausea, no vomiting  Dizziness: no  Numbness: no  Weakness: no  Visual changes: YES    History:   Head trauma: no  Family history of migraines: no  Previous tests for headaches: no  Neurologist evaluations: no  Able to do daily activities: YES, but barely. Has had to call in due to the pain  Wake with a headaches: YES  Do headaches wake you up: YES  Daily pain medication use: no  Work/school stressors/changes: no    Precipitating factors:   Does light make it worse: YES- certain lights will bother you  Does sound make it worse: no    Alleviating factors:  Does sleep help: YES    Therapies Tried and outcome: Ibuprofen, Tylenol, Advil, Excedrin - will get some relief. Thinks Excedrin probably works the best, but Advil gel caps have been giving the fastest relief.    Does have an occasional  Headache but this headache is different.    No stressors. , experiences that would have created the headache.   Has tried Tylenol , Advil,  Excederin,  Water,    Heat , ice,       Problem list and histories reviewed & adjusted, as indicated.  Additional history: as documented    Patient Active Problem List   Diagnosis     Tobacco use disorder     ASCUS of cervix with negative high risk HPV     Non morbid obesity due to excess calories     Persistent insomnia     CORINA (obstructive sleep apnea)- mild (AHI 6)     Past Surgical History:   Procedure Laterality Date     NO HISTORY OF SURGERY         Social  "History   Substance Use Topics     Smoking status: Current Every Day Smoker     Packs/day: 0.75     Years: 23.00     Types: Cigarettes     Smokeless tobacco: Never Used      Comment: started age 13     Alcohol use 0.0 oz/week     0 Standard drinks or equivalent per week      Comment: socially     Family History   Problem Relation Age of Onset     Sleep Apnea Mother      Obesity Mother      Suicide Father 37     Depression Father      Anxiety Disorder Father      Asthma Maternal Grandmother      Coronary Artery Disease Maternal Grandfather      Coronary Artery Disease Paternal Grandfather      Obesity Brother      Sleep Apnea Brother      Thyroid Disease Sister          Current Outpatient Prescriptions   Medication Sig Dispense Refill     OMEPRAZOLE PO Take 20 mg by mouth as needed       IBUPROFEN PO Take 600 mg by mouth as needed        varenicline (CHANTIX ROLANDO) 0.5 MG X 11 & 1 MG X 42 tablet Take 1 tablet by mouth 2 times daily       cetirizine (ZYRTEC) 10 MG tablet Take 1 tablet (10 mg) by mouth every evening (Patient not taking: Reported on 4/16/2018) 30 tablet 3     Allergies   Allergen Reactions     Fentanyl      Other reaction(s): Respiratory Depression     Midazolam      Other reaction(s): Respiratory Depression     No Clinical Screening - See Comments Difficulty breathing     She is unsure of name. Numbing medicine, starts with an \"s or C\".      Seasonal Allergies      Varenicline      assaultive ideation     BP Readings from Last 3 Encounters:   04/16/18 126/82   11/22/17 138/83   11/18/17 130/80    Wt Readings from Last 3 Encounters:   04/16/18 211 lb 3.2 oz (95.8 kg)   11/22/17 212 lb 3.2 oz (96.3 kg)   11/18/17 208 lb (94.3 kg)                    Reviewed and updated as needed this visit by clinical staff       Reviewed and updated as needed this visit by Provider         ROS:  CONSTITUTIONAL: NEGATIVE for fever, chills, change in weight  ENT/MOUTH: NEGATIVE for ear, mouth and throat problems  RESP: " "NEGATIVE for significant cough or SOB  CV: NEGATIVE for chest pain, palpitations or peripheral edema  NEURO: POSITIVE for headache x 2 weeks . She woke with a pounding headache ,  That made her nauseate.   Has been taking something 1-2 times per day since then   She she does take some thing for her headache it will dull the headache but it is still there.   Has called in several days because of the headache.  She will feel better with IBU to make the headache manageable.    Mother does have migraine   No changes with life .   The headache has  Woken her up because of the pain   People at  Work are noticing that she does have a headache.   No hx of migraine .  Mother and MAunts. Have issues with  Migraine headaches.   Do the     OBJECTIVE:     /82 (BP Location: Left arm, Patient Position: Chair, Cuff Size: Adult Large)  Pulse 76  Temp 98.2  F (36.8  C) (Tympanic)  Resp 18  Ht 5' 4\" (1.626 m)  Wt 211 lb 3.2 oz (95.8 kg)  LMP 04/14/2018 (Exact Date)  Breastfeeding? No  BMI 36.25 kg/m2  Body mass index is 36.25 kg/(m^2).   GENERAL: healthy, alert and fatigued  HENT: ear canals and TM's normal, nose and mouth without ulcers or lesions  RESP: lungs clear to auscultation - no rales, rhonchi or wheezes  CV: regular rate and rhythm, normal S1 S2, no S3 or S4, no murmur, click or rub, no peripheral edema and peripheral pulses strong  ABDOMEN: soft, nontender, no hepatosplenomegaly, no masses and bowel sounds normal  MS: posture is slouched, rounded shoulders,   Neck Inspection: increased cervical lordosis  Tender:  left paracervical muscles, right paracervical muscles, left trapezius muscles, right trapezius muscles  Non-tender:  occipital nerves, spinous processes  Range of Motion:  flexion:  full, painful, extension: decreased, painful, left lateral bending: decreased, painful, right lateral bending: decreased, painful, left lateral rotation:  decreased, painful, right lateral rotation:  decreased, " painful  Strength: flexion:  4/5, extension:  4-/5, lateral bendin/5, lateral rotation:  4/5            Diagnostic Test Results:  none     ASSESSMENT/PLAN:     ASSESSMENT/PLAN:      ICD-10-CM    1. Episodic tension-type headache, not intractable G44.219 cyclobenzaprine (FLEXERIL) 10 MG tablet       Patient Instructions   Do the neck exercises,   Massage Asper cream  2-4 times per day     Keep your shoulders back    Bring the up , roll them back and drop.   This should bring the chin in     Do NOT bring our head forward        Ice or heat to the back of the neck     Do the stretch ,  Resist  Stretch exercise.,       Take a Flexeril in the evening this will help to relax the muscles and it makes you tired.       Keep a regular eating , sleeping and exercise pattern.     Try  2 EX Tylenol plus and Advil gel .  You can do this  3-4 times per day for  3-4 days                   -FLexeril    See Patient Instructions    NASREEN URBAN NP, APRN [facility]

## 2018-04-17 NOTE — TELEPHONE ENCOUNTER
Left  for pt requesting return call to [facility]. Did she get to  Flexeril Rx?  Flexeril mentioned in Office Visit notes from yesterday, however not on med list.     Samira LINDER RN

## 2018-04-18 PROBLEM — G44.219 EPISODIC TENSION-TYPE HEADACHE, NOT INTRACTABLE: Status: ACTIVE | Noted: 2018-04-18

## 2018-04-18 RX ORDER — CYCLOBENZAPRINE HCL 10 MG
10 TABLET ORAL
Qty: 14 TABLET | Refills: 1 | Status: SHIPPED | OUTPATIENT
Start: 2018-04-18 | End: 2019-04-29

## 2019-04-29 ENCOUNTER — HOSPITAL ENCOUNTER (EMERGENCY)
Facility: CLINIC | Age: 39
Discharge: HOME | End: 2019-04-29
Attending: FAMILY MEDICINE | Admitting: FAMILY MEDICINE
Payer: COMMERCIAL

## 2019-04-29 ENCOUNTER — APPOINTMENT (OUTPATIENT)
Dept: GENERAL RADIOLOGY | Facility: CLINIC | Age: 39
End: 2019-04-29
Attending: FAMILY MEDICINE
Payer: COMMERCIAL

## 2019-04-29 VITALS
TEMPERATURE: 97.4 F | RESPIRATION RATE: 16 BRPM | HEIGHT: 64 IN | OXYGEN SATURATION: 98 % | WEIGHT: 210 LBS | BODY MASS INDEX: 35.85 KG/M2 | DIASTOLIC BLOOD PRESSURE: 93 MMHG | SYSTOLIC BLOOD PRESSURE: 137 MMHG | HEART RATE: 80 BPM

## 2019-04-29 DIAGNOSIS — S93.402A SPRAIN OF LEFT ANKLE, UNSPECIFIED LIGAMENT, INITIAL ENCOUNTER: ICD-10-CM

## 2019-04-29 PROCEDURE — 99283 EMERGENCY DEPT VISIT LOW MDM: CPT | Mod: Z6 | Performed by: FAMILY MEDICINE

## 2019-04-29 PROCEDURE — 99284 EMERGENCY DEPT VISIT MOD MDM: CPT | Performed by: FAMILY MEDICINE

## 2019-04-29 PROCEDURE — 73610 X-RAY EXAM OF ANKLE: CPT | Mod: TC,LT

## 2019-04-29 ASSESSMENT — ENCOUNTER SYMPTOMS
FEVER: 0
CHILLS: 0

## 2019-04-29 ASSESSMENT — MIFFLIN-ST. JEOR: SCORE: 1607.55

## 2019-04-29 NOTE — ED PROVIDER NOTES
"  History     Chief Complaint   Patient presents with     Trauma     HPI  Keely Godinez is a 38 year old female who presents to the emergency department with left ankle pain.  The patient was playfully wrestling around with the large male when she came down with all her weight on her left ankle.  She has had severe ankle pain ever since.  She has been using some old crutches at home.  She has severe pain over the lateral malleolus when she weight bears.  She has had sprained ankles in the past.  She has no pain in the foot.  No knee pain.    Allergies:  Allergies   Allergen Reactions     Fentanyl      Other reaction(s): Respiratory Depression     Midazolam      Other reaction(s): Respiratory Depression     No Clinical Screening - See Comments Difficulty breathing     She is unsure of name. Numbing medicine, starts with an \"s or C\".      Seasonal Allergies      Varenicline      assaultive ideation       Problem List:           Past Medical History:    Past Medical History:   Diagnosis Date     ASCUS of cervix with negative high risk HPV 5/26/16     Uncomplicated asthma        Past Surgical History:    Past Surgical History:   Procedure Laterality Date     NO HISTORY OF SURGERY         Family History:    Family History   Problem Relation Age of Onset     Sleep Apnea Mother      Obesity Mother      Suicide Father 37     Depression Father      Anxiety Disorder Father      Asthma Maternal Grandmother      Coronary Artery Disease Maternal Grandfather      Coronary Artery Disease Paternal Grandfather      Obesity Brother      Sleep Apnea Brother      Thyroid Disease Sister        Social History:  Marital Status:  Single [1]  Social History     Tobacco Use     Smoking status: Current Every Day Smoker     Packs/day: 0.75     Years: 23.00     Pack years: 17.25     Types: Cigarettes     Smokeless tobacco: Never Used     Tobacco comment: started age 13   Substance Use Topics     Alcohol use: Yes     Alcohol/week: 0.0 oz     " "Comment: socially     Drug use: No        Medications:      IBUPROFEN PO   OMEPRAZOLE PO         Review of Systems   Constitutional: Negative for chills and fever.   All other systems reviewed and are negative.      Physical Exam   BP: (!) 137/93  Pulse: 80  Temp: 97.4  F (36.3  C)  Resp: 16  Height: 162.6 cm (5' 4\")  Weight: 95.3 kg (210 lb)  SpO2: 98 %      Physical Exam   Constitutional: She appears well-developed and well-nourished. No distress.   HENT:   Head: Normocephalic and atraumatic.   Eyes: Conjunctivae are normal.   Neck: Normal range of motion. Neck supple.   Cardiovascular: Normal rate.   Musculoskeletal:   Left ankle as compared to the right is swollen.  Most of the swelling is around the lateral malleolus.  She has exquisite tenderness just below the lateral malleolus on palpation.  She has minimal tenderness anterior and posterior to the lateral malleolus.  No tenderness of the Achilles tendon.  No tenderness on palpation to the rest of the foot or the medial malleolus.   Neurological: She is alert.   Skin: Skin is warm. Capillary refill takes less than 2 seconds. No rash noted. No erythema. No pallor.   Psychiatric: She has a normal mood and affect.   Nursing note and vitals reviewed.      ED Course     Results for orders placed or performed during the hospital encounter of 04/29/19   XR Ankle Left G/E 3 Views    Narrative    LEFT ANKLE THREE VIEWS April 29, 2019 9:25 AM     HISTORY: Trauma. Pain and swelling.    COMPARISON: None.      Impression    IMPRESSION:   1. No visualized acute fracture, malalignment or other acute osseous  abnormality of the left ankle.  2. Mild degenerative changes in the left tibiotalar joint.    GENOVEVA HINKLE MD          Procedures               Critical Care time:  none               Results for orders placed or performed during the hospital encounter of 04/29/19 (from the past 24 hour(s))   XR Ankle Left G/E 3 Views    Narrative    LEFT ANKLE THREE VIEWS April 29, " 2019 9:25 AM     HISTORY: Trauma. Pain and swelling.    COMPARISON: None.      Impression    IMPRESSION:   1. No visualized acute fracture, malalignment or other acute osseous  abnormality of the left ankle.  2. Mild degenerative changes in the left tibiotalar joint.    GENOVEVA HINKLE MD       Medications - No data to display    Assessments & Plan (with Medical Decision Making)   MDM--38-year-old female who injured her left ankle on Saturday and has ongoing swelling and pain.  Exam shows swelling around the lateral malleolus and exquisite tenderness just under the lateral malleolus consistent with a ankle sprain.  I have recommended rest, ice and elevate.  We placed her in a ankle immobilizer and fitted her for crutches.  I recommended alternating Tylenol and ibuprofen.  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  I have reviewed the nursing notes.    I have reviewed the findings, diagnosis, plan and need for follow up with the patient.          Medication List      There are no discharge medications for this visit.         Final diagnoses:   Sprain of left ankle, unspecified ligament, initial encounter       4/29/2019   [facility] EMERGENCY DEPARTMENT     Ivette, Jaison ALEJANDRO MD  04/29/19 1011

## 2019-04-29 NOTE — ED TRIAGE NOTES
"Presents with complaints of L) ankle pain since Saturday night. She reports \"trying to wrestle a big mckay\" and came down on it wrong. Pain immediately. She reports increased pain laterally. Gia Villaseñor RN  "

## 2019-04-29 NOTE — LETTER
April 29, 2019      To Whom It May Concern:      Keely Godinez was seen in our Emergency Department today, 04/29/19.  I expect her condition to improve over the next 3-4 days.  She may return to work/school when improved.    Sincerely,        Jaison Steele MD

## 2019-04-29 NOTE — ED AVS SNAPSHOT
[facility] Emergency Department  [address] [place] [address]  Phone:  [phone]  Fax:  [phone]                                    Keely Godinez   MRN: [id number]    Department:  [facility] Emergency Department   Date of Visit:  4/29/2019           After Visit Summary Signature Page    I have received my discharge instructions, and my questions have been answered. I have discussed any challenges I see with this plan with the nurse or doctor.    ..........................................................................................................................................  Patient/Patient Representative Signature      ..........................................................................................................................................  Patient Representative Print Name and Relationship to Patient    ..................................................               ................................................  Date                                   Time    ..........................................................................................................................................  Reviewed by Signature/Title    ...................................................              ..............................................  Date                                               Time          22EPIC Rev 08/18

## 2019-05-01 ENCOUNTER — TELEPHONE (OUTPATIENT)
Dept: FAMILY MEDICINE | Facility: CLINIC | Age: 39
End: 2019-05-01

## 2019-05-01 NOTE — TELEPHONE ENCOUNTER
Patient is scheduled at 5/3/19 at 4:00 pm with Dr. Ramirez. Patient was unable to make the 1:20 pm appointment that was offered due to her daughter has ortho appointment in [place].     Lobo Hillman [facility]

## 2019-05-01 NOTE — TELEPHONE ENCOUNTER
Reason for call:  Other   Patient called regarding (reason for call): appointment  Additional comments: ED follow up left ankle and also needs updated restrictions and a note for work -     Needs to be seen tomorrow or Friday     Phone number to reach patient:  Cell number on file:    Telephone Information:   Mobile [phone]       Best Time:  ASAP     Can we leave a detailed message on this number?  YES

## 2019-05-03 ENCOUNTER — TELEPHONE (OUTPATIENT)
Dept: FAMILY MEDICINE | Facility: CLINIC | Age: 39
End: 2019-05-03

## 2019-05-03 NOTE — TELEPHONE ENCOUNTER
LM asking patient to return our call to reschedule, Dr. Ramirez is going in on a surgery this afternoon.  Please reschedule in next open spot, okay if same day or acute appt slot.  Brayden Denton, CMA

## 2019-05-03 NOTE — TELEPHONE ENCOUNTER
I spoke with patient, she stated that her employer needs to have the paper work today.  I received her HR persons phone number I called left a message that Dr. Ramirez was called into surgery and we are not able to see the patient at her scheduled time but we have her rescheduled for Monday Morning 05/06/19 @ 9:00am.  We will be able to get that paper work filled out at that appointment Monday.  If mahogany were to have any questions or concerns, I did give her the [phone] number to return the call and ask for Catherine with Dr. Ramirez.   Catherine STRONG

## 2019-05-06 ENCOUNTER — OFFICE VISIT (OUTPATIENT)
Dept: FAMILY MEDICINE | Facility: CLINIC | Age: 39
End: 2019-05-06
Payer: COMMERCIAL

## 2019-05-06 VITALS
SYSTOLIC BLOOD PRESSURE: 122 MMHG | TEMPERATURE: 97.4 F | DIASTOLIC BLOOD PRESSURE: 80 MMHG | BODY MASS INDEX: 36.05 KG/M2 | OXYGEN SATURATION: 96 % | WEIGHT: 210 LBS | HEART RATE: 100 BPM | RESPIRATION RATE: 14 BRPM

## 2019-05-06 DIAGNOSIS — Z00.00 ENCOUNTER FOR ROUTINE ADULT HEALTH EXAMINATION WITHOUT ABNORMAL FINDINGS: Primary | ICD-10-CM

## 2019-05-06 DIAGNOSIS — G47.33 OSA (OBSTRUCTIVE SLEEP APNEA): Chronic | ICD-10-CM

## 2019-05-06 DIAGNOSIS — S93.402D SPRAIN OF LEFT ANKLE, UNSPECIFIED LIGAMENT, SUBSEQUENT ENCOUNTER: ICD-10-CM

## 2019-05-06 DIAGNOSIS — E66.09 NON MORBID OBESITY DUE TO EXCESS CALORIES: Chronic | ICD-10-CM

## 2019-05-06 DIAGNOSIS — F17.200 TOBACCO USE DISORDER: ICD-10-CM

## 2019-05-06 DIAGNOSIS — R87.610 ASCUS OF CERVIX WITH NEGATIVE HIGH RISK HPV: ICD-10-CM

## 2019-05-06 DIAGNOSIS — K21.9 GASTROESOPHAGEAL REFLUX DISEASE WITHOUT ESOPHAGITIS: ICD-10-CM

## 2019-05-06 PROBLEM — E66.01 MORBID OBESITY (H): Status: RESOLVED | Noted: 2019-05-06 | Resolved: 2019-05-06

## 2019-05-06 PROBLEM — E66.01 MORBID OBESITY (H): Status: ACTIVE | Noted: 2019-05-06

## 2019-05-06 PROCEDURE — G0145 SCR C/V CYTO,THINLAYER,RESCR: HCPCS | Performed by: FAMILY MEDICINE

## 2019-05-06 PROCEDURE — 99213 OFFICE O/P EST LOW 20 MIN: CPT | Mod: 25 | Performed by: FAMILY MEDICINE

## 2019-05-06 PROCEDURE — 99396 PREV VISIT EST AGE 40-64: CPT | Performed by: FAMILY MEDICINE

## 2019-05-06 PROCEDURE — 87624 HPV HI-RISK TYP POOLED RSLT: CPT | Performed by: FAMILY MEDICINE

## 2019-05-06 ASSESSMENT — PAIN SCALES - GENERAL: PAINLEVEL: MODERATE PAIN (4)

## 2019-05-06 NOTE — LETTER
May 6, 2019      Keely Godinez  [id number] [facility] [address]        To Whom It May Concern:    Keely Godinez  was seen on 5/6/2019.  Please excuse her  until 5/13/2019 due to injury.        Sincerely,        Juan Nuno Mai, MD

## 2019-05-06 NOTE — PROGRESS NOTES
SUBJECTIVE:   CC: Keely Godinez is an 38 year old woman who presents for preventive health visit.     Healthy Habits:    Do you get at least three servings of calcium containing foods daily (dairy, green leafy vegetables, etc.)? yes    Amount of exercise or daily activities, outside of work: NONE    Problems taking medications regularly not applicable    Medication side effects: No    Have you had an eye exam in the past two years? no    Do you see a dentist twice per year? yes    Do you have sleep apnea, excessive snoring or daytime drowsiness?yes      PROBLEMS TO ADD ON...    ED/UC Followup:    Facility:  [facility]  Date of visit: 4/29/19  Reason for visit: Left ankle pain  Current Status: Sprain of left ankle, unspecified ligament, initial encounter         Keely is here today for her general physical with a couple of concerns.    1.  First concern is ER follow up on the ankle sprain.  She presented to the ER on 4/29/19 for painful and swollen ankle after she twisted inversely.  Stated that she was kicking around, fell and landed with all her weight on the left ankle which was used to adversely.  She was having a lot of pain immediately.  It was swollen and red.  Went to the ER with normal x-ray.  Been using ankle brace and using crutches.  It is overall feeling better but still been quite a bit of pain.  The swelling and redness are resolving.  Able to put some weight on the ankle now.  She needs a note for work.  She works in as a  therefore she is not able to work until she is completely heal - able to walk without any limitation.  Still has the with weightbearing activities or prolonged standing.  Not ready to go back to work at this time.  Stated she has has multiple sprains on the same ankle in the past.  No numbness or tingling sensation.  No knee, hip or back pain.    2.  Otherwise, she is doing well.  She is known to have mild sleep apnea, no CPAP required.  Her last sleep study  was couple years ago; no significant weight change since the last sleep study.  Her heartburn is well controlled with omeprazole, which she takes only as needed.  Her last physical exam was couple years ago and it was normal.  No major medical care or procedure done since the last physical. No headache or dizziness.  No acute visual change. No URI symptoms include running nose, nasal congestion, coughing, fever or chill. No CP/SOB. No N/V/D/C. No problem with urination.  No abdominal pain.  Menstrual period has been stable, normal and regular.  Last pap was couple years ago and it was ASCUS with negative high risk HPV.  Denied of STD risks.  Not on BC and is okay with it.  No leg swelling or pain. Not exercising. Social drinker but no drug use.  Smokes about 3/4 ppd for years, not ready to quit at this tiem. No problem with sleeping.  Feels safe at home and at work.  She lives .  No weight change and denied of being depressed. No other concern today    Today's PHQ-2 Score:   PHQ-2 ( 1999 [employer]) 4/16/2018 2/3/2017   Q1: Little interest or pleasure in doing things 0 0   Q2: Feeling down, depressed or hopeless 0 0   PHQ-2 Score 0 0       Abuse: Current or Past(Physical, Sexual or Emotional)- No  Do you feel safe in your environment? Yes    Social History     Tobacco Use     Smoking status: Current Every Day Smoker     Packs/day: 0.75     Years: 23.00     Pack years: 17.25     Types: Cigarettes     Smokeless tobacco: Never Used     Tobacco comment: started age 13   Substance Use Topics     Alcohol use: Yes     Alcohol/week: 0.0 oz     Comment: socially     If you drink alcohol do you typically have >3 drinks per day or >7 drinks per week? No                     Reviewed orders with patient.  Reviewed health maintenance and updated orders accordingly - Yes  BP Readings from Last 3 Encounters:   05/06/19 122/80   04/29/19 (!) 137/93   04/16/18 126/82    Wt Readings from Last 3 Encounters:   05/06/19 95.3 kg (210  "lb)   04/29/19 95.3 kg (210 lb)   04/16/18 95.8 kg (211 lb 3.2 oz)                  Patient Active Problem List   Diagnosis     Tobacco use disorder     ASCUS of cervix with negative high risk HPV     Non morbid obesity due to excess calories     Persistent insomnia     CORINA (obstructive sleep apnea)- mild (AHI 6)     Episodic tension-type headache, not intractable     Female infertility associated with anovulation     GERD (gastroesophageal reflux disease)     Past Surgical History:   Procedure Laterality Date     NO HISTORY OF SURGERY         Social History     Tobacco Use     Smoking status: Current Every Day Smoker     Packs/day: 0.75     Years: 23.00     Pack years: 17.25     Types: Cigarettes     Smokeless tobacco: Never Used     Tobacco comment: started age 13   Substance Use Topics     Alcohol use: Yes     Alcohol/week: 0.0 oz     Comment: socially     Family History   Problem Relation Age of Onset     Sleep Apnea Mother      Obesity Mother      Suicide Father 37     Depression Father      Anxiety Disorder Father      Asthma Maternal Grandmother      Coronary Artery Disease Maternal Grandfather      Coronary Artery Disease Paternal Grandfather      Breast Cancer Paternal Grandmother      No Known Problems Daughter      Obesity Brother      Sleep Apnea Brother      Thyroid Disease Sister          Current Outpatient Medications   Medication Sig Dispense Refill     IBUPROFEN PO Take 600 mg by mouth as needed        OMEPRAZOLE PO Take 20 mg by mouth as needed       Allergies   Allergen Reactions     Fentanyl      Other reaction(s): Respiratory Depression     Midazolam      Other reaction(s): Respiratory Depression     No Clinical Screening - See Comments Difficulty breathing     She is unsure of name. Numbing medicine, starts with an \"s or C\".      Seasonal Allergies      Varenicline      assaultive ideation     Recent Labs   Lab Test 11/18/17  1140 05/26/16  0954   LDL  --  70   HDL  --  62   TRIG  --  109   ALT "  --  27   CR 0.83 0.79   GFRESTIMATED 76 81   GFRESTBLACK >90 >90  African American GFR Calc     POTASSIUM 3.8 4.0   TSH  --  0.39*        Mammogram Screening: Patient under age 50, mutual decision reflected in health maintenance.      Pertinent mammograms are reviewed under the imaging tab.  History of abnormal Pap smear: YES - updated in Problem List and Health    Maintenance accordingly  PAP / HPV Latest Ref Rng & Units 5/26/2016   PAP - ASC-US(A)   HPV 16 DNA NEG Negative   HPV 18 DNA NEG Negative   OTHER HR HPV NEG Negative     Reviewed and updated as needed this visit by clinical staff  Tobacco  Allergies  Meds  Soc Hx        Reviewed and updated as needed this visit by Provider        Past Medical History:   Diagnosis Date     ASCUS of cervix with negative high risk HPV 5/26/16 5/26/16 ASCUS/neg HR HPV      Uncomplicated asthma       Past Surgical History:   Procedure Laterality Date     NO HISTORY OF SURGERY         ROS:  CONSTITUTIONAL: NEGATIVE for fever, chills, change in weight  INTEGUMENTARU/SKIN: NEGATIVE for worrisome rashes, moles or lesions  EYES: NEGATIVE for vision changes or irritation  ENT: NEGATIVE for ear, mouth and throat problems  RESP: NEGATIVE for significant cough or SOB  BREAST: NEGATIVE for masses, tenderness or discharge  CV: NEGATIVE for chest pain, palpitations or peripheral edema  GI: NEGATIVE for nausea, abdominal pain, heartburn, or change in bowel habits  : NEGATIVE for unusual urinary or vaginal symptoms. Periods are regular.  MUSCULOSKELETAL: NEGATIVE for significant arthralgias or myalgia  NEURO: NEGATIVE for weakness, dizziness or paresthesias  ENDOCRINE: NEGATIVE for temperature intolerance, skin/hair changes  HEME/ALLERGY/IMMUNE: NEGATIVE for bleeding problems  PSYCHIATRIC: NEGATIVE for changes in mood or affect         Reviewed and updated as needed this visit by Provider         Patient Active Problem List   Diagnosis     Tobacco use disorder     ASCUS of cervix  "with negative high risk HPV     Non morbid obesity due to excess calories     Persistent insomnia     CORINA (obstructive sleep apnea)- mild (AHI 6)     Episodic tension-type headache, not intractable     Female infertility associated with anovulation     GERD (gastroesophageal reflux disease)     Past Surgical History:   Procedure Laterality Date     NO HISTORY OF SURGERY         Social History     Tobacco Use     Smoking status: Current Every Day Smoker     Packs/day: 0.75     Years: 23.00     Pack years: 17.25     Types: Cigarettes     Smokeless tobacco: Never Used     Tobacco comment: started age 13   Substance Use Topics     Alcohol use: Yes     Alcohol/week: 0.0 oz     Comment: socially     Family History   Problem Relation Age of Onset     Sleep Apnea Mother      Obesity Mother      Suicide Father 37     Depression Father      Anxiety Disorder Father      Asthma Maternal Grandmother      Coronary Artery Disease Maternal Grandfather      Coronary Artery Disease Paternal Grandfather      Breast Cancer Paternal Grandmother      No Known Problems Daughter      Obesity Brother      Sleep Apnea Brother      Thyroid Disease Sister          Current Outpatient Medications   Medication Sig Dispense Refill     IBUPROFEN PO Take 600 mg by mouth as needed        OMEPRAZOLE PO Take 20 mg by mouth as needed       Allergies   Allergen Reactions     Fentanyl      Other reaction(s): Respiratory Depression     Midazolam      Other reaction(s): Respiratory Depression     No Clinical Screening - See Comments Difficulty breathing     She is unsure of name. Numbing medicine, starts with an \"s or C\".      Seasonal Allergies      Varenicline      assaultive ideation     Recent Labs   Lab Test 11/18/17  1140 05/26/16  0954   LDL  --  70   HDL  --  62   TRIG  --  109   ALT  --  27   CR 0.83 0.79   GFRESTIMATED 76 81   GFRESTBLACK >90 >90  African American GFR Calc     POTASSIUM 3.8 4.0   TSH  --  0.39*      BP Readings from Last 3 " Encounters:   05/06/19 122/80   04/29/19 (!) 137/93   04/16/18 126/82    Wt Readings from Last 3 Encounters:   05/06/19 95.3 kg (210 lb)   04/29/19 95.3 kg (210 lb)   04/16/18 95.8 kg (211 lb 3.2 oz)          ROS:  Constitutional, HEENT, cardiovascular, pulmonary, GI, , musculoskeletal, neuro, skin, endocrine and psych systems are negative, except as otherwise noted.    OBJECTIVE:     /80   Pulse 100   Temp 97.4  F (36.3  C) (Temporal)   Resp 14   Wt 95.3 kg (210 lb)   LMP 04/12/2019 (Exact Date)   SpO2 96%   BMI 36.05 kg/m    Body mass index is 36.05 kg/m .   GENERAL: healthy, alert and no distress  EYES: Eyes grossly normal to inspection, PERRL and conjunctivae and sclerae normal  HENT: ear canals and TM's normal.  Nares are non-congested. Oropharynx is pink and moist. No tender with palpation to the sinuses.  NECK: no adenopathy.  Supple, no lymphadenopathy or thyromegaly.  RESP: lungs clear to auscultation - no rales, rhonchi or wheezes  BREAST: Offered and recommended but she declined.  She has no concerns.  CV: regular rate and rhythm, no murmur  ABDOMEN: soft, nontender, nondistended, no palpable masses organomegaly no bowel sounds.  No CVA tenderness.   (female): normal female external genitalia, normal urethral meatus, vaginal mucosa, normal cervix/adnexa/uterus without masses or discharge. Pending for pap and HPV.  MS: no gross musculoskeletal defects noted, no edema. All joints are in the normal range of motion and 4 extremities were equally in strength. Hips, knees, shoulders, elbows, wrists exams were normal. Fine motor skills of the fingers are intact. Back is straight, no tender with palpation to the spine.  Left ankle with no swelling no redness.  Tender with palpation to the lateral aspect of the ankle and the lateral malleolus.  Also pain to the ankle with inversion but not eversion.  Negative squeeze test.  Strong pedal pulses bilaterally.  SKIN: no suspicious lesions or  rashes  NEURO: Normal strength and tone, mentation intact and speech normal.  Cranial nerves II to XII intact, DTR +2 throughout, no focal neurological deficit.  PSYCH: mentation appears normal, affect normal/bright.  Thoughts intact, no suicidal/homicidal ideation.  No hallucination.    Diagnostic Test Results:  Results for orders placed or performed in visit on 05/06/19   Pap imaged thin layer screen with HPV - recommended age 30 - 65 years (select HPV order below)   Result Value Ref Range    PAP NIL     Copath Report         Patient Name: ANABELL KRISHNA  MR#: [id number]  Specimen #: [id number]  Collected: 5/6/2019  Received: 5/6/2019  Reported: 5/8/2019 10:49  Ordering Phy(s): DIANE NG MAI    For improved result formatting, select 'View Enhanced Report Format' under   Linked Documents section.    SPECIMEN/STAIN PROCESS:  Pap imaged thin layer prep screening (Surepath, FocalPoint with guided   screening)       Pap-Cyto x 1, HPV ordered x 1    SOURCE: Cervical, endocervical  ----------------------------------------------------------------   Pap imaged thin layer prep screening (Surepath, FocalPoint with guided   screening)  SPECIMEN ADEQUACY:  Satisfactory for evaluation.  -Transformation zone component present.    CYTOLOGIC INTERPRETATION:    Negative for intraepithelial lesion or malignancy    Electronically signed out by:  GINGER Roberts  (ASCP)    CLINICAL HISTORY:  LMP: 04/12/2019  Previous ASC-US  Date of Last Pap: 05/26/2016,    Papanicolaou Test Limitations:  Cervical cytology is a screening test  with   limited sensitivity; regular  screening is critical for cancer prevention; Pap tests are primarily   effective for the diagnosis/prevention of  squamous cell carcinoma, not adenocarcinomas or other cancers.    COLLECTION SITE:  Client:  [facility]  Location: [facility] (P)    The technical component of this testing was completed at the [facility], with the professional component performed   at the [facility], [address],   [place] [address] ([phone])       HPV High Risk Types DNA Cervical   Result Value Ref Range    HPV Source SurePath     HPV 16 DNA Negative NEG^Negative    HPV 18 DNA Negative NEG^Negative    Other HR HPV Negative NEG^Negative    Final Diagnosis This patient's sample is negative for HPV DNA.     Specimen Description Cervical Cells        ASSESSMENT/PLAN:     1. Encounter for routine adult health examination without abnormal findings  Overall Keely is healthy and doing well.  UTD for immunization.  Topics appropriate for her age discussed include safety issue and healthy lifestyle modification as well as substance abuse/STD/depression prevention. Recommended daily exercise for at least 30 minutes.  Emphasized on healthy diet with adequate fluid intake and resting. Feels safe at home and at work.  Follow in 1 year, earlier as needed.  Labs as ordered below.  Discussed about breast cancer and colon cancer screening.  No indication for colon cancer screening at this time and she is okay to hold it off for now.  Offered mammogram but she declined.  No risk for breast cancer identified.  Discussed about self breast examination.  All of her questions were answered.    - Pap imaged thin layer screen with HPV - recommended age 30 - 65 years (select HPV order below)  - HPV High Risk Types DNA Cervical  - Comprehensive metabolic panel (BMP + Alb, Alk Phos, ALT, AST, Total. Bili, TP); Future  - Lipid panel reflex to direct LDL Fasting; Future  - TSH with free T4 reflex; Future    2. CORINA (obstructive sleep apnea)- mild (AHI 6)  Stable, no CPAP was needed.  No symptoms.  Last sleep study couple years ago; no change in her weight.  Encouraged to her to work on weight loss.    3. Gastroesophageal reflux disease without esophagitis  Stable  and is doing well.  Continue with omeprazole as needed.  Diet modification discussed.    4. Non morbid obesity due to excess calories  Discussed with patient about the nature of the condition and its long term and short term effects.  Encouraged daily exercise and healthy/portioned diet.  Recommend her to set a goal of losing 2-4 # a month and work toward that with healthy life style modification.  Discussed with patient the goal for his weight and his BMI.  TSH level was checked today and it was normal.    5. Sprain of left ankle, unspecified ligament, subsequent encounter  Discussed with patient about nature of the condition.  Improving as expected.  Encouraged her to continue with her normal activities as tolerated. Also recommend stretching exercise which was demonstrated. Tylenol and Motrin as need. Call in if symptoms persist or worse in the next couple weeks.  Note to excuse him from work was written on her behalf.    6. Tobacco use disorder  Keely has been smoking for years.  Discussed with her about the long and short term consequences of tobacco smoking.  Discussed with her about different options for smoking cessation aids.  She is not ready to quit smoking at this time.  Encourage to let me know when she is ready to quit.  In a mean time, I encourage her to reduce to amount of cigarrets smoke as much as possible.  All of her questions were answered.    7. ASCUS of cervix with negative high risk HPV  Pap smear and HPV today was normal.  Repeat in 5 years.     COUNSELING:   Reviewed preventive health counseling, as reflected in patient instructions       Regular exercise       Healthy diet/nutrition       Alcohol Use       Family planning       Safe sex practices/STD prevention       Colon cancer screening       (Taylor)menopause management    BP Readings from Last 1 Encounters:   05/06/19 122/80     Estimated body mass index is 36.05 kg/m  as calculated from the following:    Height as of 4/29/19: 1.626 m  "(5' 4\").    Weight as of this encounter: 95.3 kg (210 lb).    BP Screening:   Last 3 BP Readings:    BP Readings from Last 3 Encounters:   05/06/19 122/80   04/29/19 (!) 137/93   04/16/18 126/82       The following was recommended to the patient:  Re-screen BP within a year and recommended lifestyle modifications  Weight management plan: Discussed healthy diet and exercise guidelines     reports that she has been smoking cigarettes.  She has a 17.25 pack-year smoking history. She has never used smokeless tobacco.  Tobacco Cessation Action Plan: Information offered: Patient not interested at this time    Counseling Resources:  ATP IV Guidelines  Pooled Cohorts Equation Calculator  Breast Cancer Risk Calculator  FRAX Risk Assessment  ICSI Preventive Guidelines  Dietary Guidelines for Americans, 2010  USDA's MyPlate  ASA Prophylaxis  Lung CA Screening    Juan Nuno Mai, MD  [facility]  "

## 2019-05-08 LAB
COPATH REPORT: NORMAL
PAP: NORMAL

## 2019-05-09 LAB
FINAL DIAGNOSIS: NORMAL
HPV HR 12 DNA CVX QL NAA+PROBE: NEGATIVE
HPV16 DNA SPEC QL NAA+PROBE: NEGATIVE
HPV18 DNA SPEC QL NAA+PROBE: NEGATIVE
SPECIMEN DESCRIPTION: NORMAL
SPECIMEN SOURCE CVX/VAG CYTO: NORMAL

## 2019-05-10 ENCOUNTER — TELEPHONE (OUTPATIENT)
Dept: FAMILY MEDICINE | Facility: CLINIC | Age: 39
End: 2019-05-10

## 2019-05-10 NOTE — TELEPHONE ENCOUNTER
Reason for Call:  Other     Detailed comments:  Keely was told to call back and give you a update on her ankle. She states it is not worse but not improving. She is wondering if you want to see her again. Please advise.     Phone Number Patient can be reached at: Home number on file [phone] (home)    Best Time:      Can we leave a detailed message on this number? YES    Call taken on 5/10/2019 at 9:07 AM by Rajani Allen

## 2019-05-10 NOTE — TELEPHONE ENCOUNTER
Per VM, continue activity as tolerated. No need for visit unless getting worse. If worsening could get another x ray if patient preferred  Barbara Valencia CMA

## 2019-05-13 ENCOUNTER — MYC MEDICAL ADVICE (OUTPATIENT)
Dept: FAMILY MEDICINE | Facility: CLINIC | Age: 39
End: 2019-05-13

## 2019-05-13 NOTE — LETTER
May 14, 2019      Keely Godinez  [address]  [facility] [address]        To Whom It May Concern:    Keely Godinez was seen in our clinic. She is unable to return to work until re-evaluated on 5/20/19 due to her limitations.     Sincerely,        Juan Nuno Mai, MD

## 2019-05-13 NOTE — TELEPHONE ENCOUNTER
Reason for Call:  Other call back    Detailed comments: Patient needs note from PCP regarding restrictions ect.. Please mychart or call patient back and possible attach form through [employer]?     Phone Number Patient can be reached at: Cell number on file:    Telephone Information:   Mobile [phone]       Best Time: any     Can we leave a detailed message on this number? YES    Call taken on 5/13/2019 at 11:21 AM by Gabriella Gayle

## 2019-05-13 NOTE — TELEPHONE ENCOUNTER
Called patient to let her know that dr carmona has left for the evening but he will be addressing calls this evening we will know tomorrow.     .Left message for patient to return call.  Florecita Kilgore MA 5/13/2019

## 2019-05-14 ENCOUNTER — MYC MEDICAL ADVICE (OUTPATIENT)
Dept: FAMILY MEDICINE | Facility: CLINIC | Age: 39
End: 2019-05-14

## 2019-05-14 NOTE — TELEPHONE ENCOUNTER
I recommended no specific restriction for now, advance her normal activities as tolerated. If she feel she can't get her job done because of the pain, write another week note off work with the limitations that she is experiencing and will go from there. thanks

## 2019-05-14 NOTE — TELEPHONE ENCOUNTER
Letter was faxed to employer for patient. Patient is scheduled to see Dr. Ramirez on 5/20/19.  Lobo Hillman CMA

## 2019-05-14 NOTE — TELEPHONE ENCOUNTER
Spoke with patient and she states that she is not able to return to work with the expectations from her work. A letter was faxed to her employer ([employer]) at [phone]. Patient is scheduled to see Dr. Ramirez on 5/20/19 to be re-evaluated.    Lobo Hillman CMA

## 2019-05-20 ENCOUNTER — OFFICE VISIT (OUTPATIENT)
Dept: FAMILY MEDICINE | Facility: CLINIC | Age: 39
End: 2019-05-20
Payer: COMMERCIAL

## 2019-05-20 VITALS
RESPIRATION RATE: 14 BRPM | SYSTOLIC BLOOD PRESSURE: 118 MMHG | DIASTOLIC BLOOD PRESSURE: 72 MMHG | OXYGEN SATURATION: 95 % | BODY MASS INDEX: 36.22 KG/M2 | WEIGHT: 211 LBS | HEART RATE: 96 BPM | TEMPERATURE: 97.4 F

## 2019-05-20 DIAGNOSIS — S93.402D SPRAIN OF LEFT ANKLE, UNSPECIFIED LIGAMENT, SUBSEQUENT ENCOUNTER: Primary | ICD-10-CM

## 2019-05-20 PROCEDURE — 99213 OFFICE O/P EST LOW 20 MIN: CPT | Performed by: FAMILY MEDICINE

## 2019-05-20 ASSESSMENT — PAIN SCALES - GENERAL: PAINLEVEL: NO PAIN (0)

## 2019-05-20 NOTE — PROGRESS NOTES
"Subjective     Keely Godinez is a 38 year old female who presents to clinic today for the following health issues:    HPI   Follow up on ankle pain and work note     Keely is here today for follow-up on her ankle sprain.  She was seen about a week ago and please see my last dictation for further details.  Overall she is feeling much better and she thinks the injury is resolving.  She works as a , her work requires completely recovery with the ability to run and to go up and down the stairs.  Still has some soreness with prolonged standing, walking and therefore she does not think that she is able to run or do going up or stand down the stairs.  She needs a note for work with such restriction.  The swelling is gone.  No other concern.    {  Current Outpatient Medications   Medication Sig Dispense Refill     IBUPROFEN PO Take 600 mg by mouth as needed        OMEPRAZOLE PO Take 20 mg by mouth as needed       Allergies   Allergen Reactions     Fentanyl      Other reaction(s): Respiratory Depression     Midazolam      Other reaction(s): Respiratory Depression     No Clinical Screening - See Comments Difficulty breathing     She is unsure of name. Numbing medicine, starts with an \"s or C\".      Seasonal Allergies      Varenicline      assaultive ideation       Reviewed and updated as needed this visit by Provider         Review of Systems   ROS COMP: Constitutional, HEENT, cardiovascular, pulmonary, gi and gu systems are negative, except as otherwise noted.      Objective    /72   Pulse 96   Temp 97.4  F (36.3  C) (Temporal)   Resp 14   Wt 95.7 kg (211 lb)   LMP 05/10/2019   SpO2 95%   BMI 36.22 kg/m    Body mass index is 36.22 kg/m .  Physical Exam    GENERAL: healthy, alert and no distress  MS: Walk without limping. Knees and hips exam were normal.  Left ankle with no swelling redness on feet with strong pedal pulses bilaterally.  No ecchymosis. No tender with palpation to the ankle, " "malleolus or distal fibia/tibia. Normal range of motion passively and actively. Negative squeeze test, anterior and posterior draw tests.      Diagnostic Test Results:  none         Assessment & Plan       ICD-10-CM    1. Sprain of left ankle, unspecified ligament, subsequent encounter S93.402D      Resolving and is healing as expected.  Continue with her normal activities as tolerated.  Her work has strict requirement.  A note for her work was written - she may go back to work with restrictions on running and going up and down the stairs.  May advance to normal activities as tolerated.  Follow-up as needed.    Tobacco Cessation:   reports that she has been smoking cigarettes.  She has a 17.25 pack-year smoking history. She has never used smokeless tobacco.  Tobacco Cessation Action Plan: Information offered: Patient not interested at this time      BMI:   Estimated body mass index is 36.22 kg/m  as calculated from the following:    Height as of 4/29/19: 1.626 m (5' 4\").    Weight as of this encounter: 95.7 kg (211 lb).   Weight management plan: Discussed healthy diet and exercise guidelines        No follow-ups on file.    Juan Nuno Mai, MD  [facility]      "

## 2019-05-20 NOTE — LETTER
[address] [address]  Phone: [phone]  Fax: [phone]      REPORT OF WORK ABILITY    NOTE TO EMPLOYEE: You must promptly provide a copy of this report to your  employer or worker's compensation insurer, and Qualified Rehabilitation Consultant.    Date: 5/20/2019                     Employee Name: Keely Godinez         YOB: 1980  Medical Record Number: [id number]   Soc.Sec.No: [id number]  Employer: None                Date of Injury: 04/27/2019  Managed Care Organization / Insurance Company Name: None    Diagnosis: Left ankle sprain  Work Related: no     MMI: UNKNOWN   Permanent Partial Disability(PPD) likely: NO    EMPLOYEE IS ABLE TO WORK: Return to work with restrictions on 05/21/2019     RESTRICTIONS IF ANY:     No running or going up and down the stair for a week; may advance as tolerated    OTHER RESTRICTIONS: None    TREATMENT PLAN/NOTES: None - recover as expected.      Juan Ramierz MD.

## 2019-05-28 ENCOUNTER — MYC MEDICAL ADVICE (OUTPATIENT)
Dept: FAMILY MEDICINE | Facility: CLINIC | Age: 39
End: 2019-05-28

## 2019-06-07 ENCOUNTER — MYC MEDICAL ADVICE (OUTPATIENT)
Dept: FAMILY MEDICINE | Facility: CLINIC | Age: 39
End: 2019-06-07

## 2019-06-15 ENCOUNTER — MYC MEDICAL ADVICE (OUTPATIENT)
Dept: FAMILY MEDICINE | Facility: CLINIC | Age: 39
End: 2019-06-15

## 2019-06-15 NOTE — LETTER
[address] [address]  Phone: [phone]  Fax: [phone]    June 17, 2019        Keely Godinez  [id number]  [facility] [address]          To whom it may concern:    RE: Keely Godinez    Patient may return to work 6/17/19 with the following:  No restrictions    Please contact me for questions or concerns.      Sincerely,        Juan Nuno Mai, MD

## 2019-06-18 ENCOUNTER — MYC MEDICAL ADVICE (OUTPATIENT)
Dept: FAMILY MEDICINE | Facility: CLINIC | Age: 39
End: 2019-06-18

## 2019-09-09 ENCOUNTER — TELEPHONE (OUTPATIENT)
Dept: FAMILY MEDICINE | Facility: CLINIC | Age: 39
End: 2019-09-09

## 2019-09-09 NOTE — TELEPHONE ENCOUNTER
Summary:    Patient is due/failing the following:   HIV screen, flu shot, tsh, lipids, cmp    Action needed:   Patient needs fasting lab only appointment    Type of outreach:    Phone, left message for patient to call back.   Please assist in getting pt scheduled for a fasting lab appointment.  See if she would like to get a flu shot as well.    Questions for provider review:    None                                                                                                                                    Brayden Denton CMA       Chart routed to Care Team .            Panel Management Review      Patient has the following on her problem list: None      Composite cancer screening  Chart review shows that this patient is due/due soon for the following None

## 2019-11-03 ENCOUNTER — HEALTH MAINTENANCE LETTER (OUTPATIENT)
Age: 39
End: 2019-11-03

## 2019-11-27 NOTE — TELEPHONE ENCOUNTER
The lab orders are no longer in chart as future for patient to complete.  Closing enc.  Brayden Denton, CMA

## 2020-02-24 ENCOUNTER — TELEPHONE (OUTPATIENT)
Dept: FAMILY MEDICINE | Facility: CLINIC | Age: 40
End: 2020-02-24

## 2020-02-24 NOTE — TELEPHONE ENCOUNTER
JACINTO for pt that her appt with GIL on 2-28 is not appropriately scheduled. She can come at 1:00 or told to call and reschedule.  CONY

## 2020-02-24 NOTE — TELEPHONE ENCOUNTER
This appt was made via [facility]. Is a 15 minute appointment going to be OK for this?   ----- Message -----   From: Keely Godinez   Sent: 2/24/2020   8:16 AM CST   To: [facility] Scheduling And Registration   Subject: Appointment scheduled from [employer]                 Appointment For: Keely Hutchinsonpkins ([id number])   Visit Type: [facility] OFFICE VISIT SHORT (910)      2/28/2020    2:45 PM  15 mins.  Galileo Otoole MD [facility]      Patient Comments:   Left hip and lower back pain.

## 2020-02-28 ENCOUNTER — OFFICE VISIT (OUTPATIENT)
Dept: FAMILY MEDICINE | Facility: CLINIC | Age: 40
End: 2020-02-28
Payer: COMMERCIAL

## 2020-02-28 VITALS
BODY MASS INDEX: 36.54 KG/M2 | OXYGEN SATURATION: 97 % | HEIGHT: 64 IN | WEIGHT: 214 LBS | SYSTOLIC BLOOD PRESSURE: 138 MMHG | TEMPERATURE: 97.4 F | DIASTOLIC BLOOD PRESSURE: 88 MMHG | HEART RATE: 76 BPM | RESPIRATION RATE: 16 BRPM

## 2020-02-28 DIAGNOSIS — M51.16 LUMBAR DISC HERNIATION WITH RADICULOPATHY: Primary | ICD-10-CM

## 2020-02-28 PROCEDURE — 99214 OFFICE O/P EST MOD 30 MIN: CPT | Performed by: FAMILY MEDICINE

## 2020-02-28 ASSESSMENT — MIFFLIN-ST. JEOR: SCORE: 1620.7

## 2020-02-28 NOTE — PATIENT INSTRUCTIONS
Thank you for choosing [facility].  You may be receiving an email and/or telephone survey request from [facility] Customer [employer] regarding your visit today.  Please take a few minutes to respond to the survey to let us know how we are doing.      If you have questions or concerns, please contact us via [employer] or you can contact your care team at [phone].    Our Clinic hours are:  Monday 6:40 am  to 7:00 pm  Tuesday -Friday 6:40 am to 5:00 pm    The [place] outpatient lab hours are:  Monday - Friday 6:10 am to 4:45 pm  Saturdays 7:00 am to 11:00 am  Appointments are required, call [phone]    If you have clinical questions after hours or would like to schedule an appointment,  call the clinic at [phone].    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 This is causing pressure on the nerve that goes down the leg.   Avoid repetitive bending and twisting and lifting. Tylenol and advil may be used. Physical therapy is ordered with traction. If not better quickly, in a few sessions, then call   [phone] for the MRI. We will call the results. Make the follow up appt with Dr. Ramirez for 3-16-20 or sooner. See the work letter for restrictions.

## 2020-03-05 ENCOUNTER — HOSPITAL ENCOUNTER (OUTPATIENT)
Dept: PHYSICAL THERAPY | Facility: CLINIC | Age: 40
Setting detail: THERAPIES SERIES
End: 2020-03-05
Attending: FAMILY MEDICINE
Payer: COMMERCIAL

## 2020-03-05 DIAGNOSIS — M51.16 LUMBAR DISC HERNIATION WITH RADICULOPATHY: ICD-10-CM

## 2020-03-05 PROCEDURE — 97140 MANUAL THERAPY 1/> REGIONS: CPT | Mod: GP

## 2020-03-05 PROCEDURE — 97110 THERAPEUTIC EXERCISES: CPT | Mod: GP

## 2020-03-05 PROCEDURE — 97161 PT EVAL LOW COMPLEX 20 MIN: CPT | Mod: GP

## 2020-03-05 PROCEDURE — 97535 SELF CARE MNGMENT TRAINING: CPT | Mod: GP

## 2020-03-05 NOTE — PROGRESS NOTES
03/05/20 1600   General Information   Type of Visit Initial OP Ortho PT Evaluation   Start of Care Date 03/05/20   Referring Physician Dr Lao   Patient/Family Goals Statement get rid of pain   Orders Evaluate and Treat   Orders Comment use traction on the lumbar area for possible L5 disc herniation and radiculopathy.   Date of Order 02/28/20   Certification Required? No   Medical Diagnosis Lumbar disc herniation with radiculopathy    Surgical/Medical history reviewed Yes  (na)   Body Part(s)   Body Part(s) Lumbar Spine/SI   Presentation and Etiology   Pertinent history of current problem (include personal factors and/or comorbidities that impact the POC) Pt started having LBP a couple years ago. It has progressively worsened in the last 6 months. It has become constant in the last 2 months.  Also has lat L hip pain. Not sure if it is related to the back . Started 2 months ago. Hurts worse than the back.   Impairments A. Pain;B. Decreased WB tolerance;E. Decreased flexibility;H. Impaired gait;K. Numbness;L. Tingling   Functional Limitations perform activities of daily living;perform required work activities;perform desired leisure / sports activities  (prolonged sitting, standing after sitting, stairs, driving)   Symptom Location L LB into buttock and post LE to ankle (tingling/numbness) and lateral hip   How/Where did it occur From insidious onset   Onset date of current episode/exacerbation 01/05/20   Chronicity Chronic   Pain rating (0-10 point scale) Best (/10);Worst (/10)  (currently 5/10)   Best (/10) 1  (sleeping)   Worst (/10) 8  (moving positions after being stationary for a while)   Pain quality B. Dull;C. Aching;D. Burning   Frequency of pain/symptoms A. Constant   Pain/symptoms are: Worse during the day   Pain/symptoms exacerbated by A. Sitting;B. Walking;C. Lifting;D. Carrying;G. Certain positions;I. Bending;K. Home tasks;L. Work tasks   Pain/symptoms eased by C. Rest   Progression of symptoms  "since onset: Unchanged   Prior Level of Function   Functional Level Prior Comment independent   Current Level of Function   Current Community Support Family/friend caregiver   Patient role/employment history A. Employed   Employment Comments work for [employer]   Living environment House/townhome   Home/community accessibility stairs, drives   Fall Risk Screen   Fall screen completed by PT   Have you fallen 2 or more times in the past year? No   Have you fallen and had an injury in the past year? Yes   Timed Up and Go score (seconds) 9   Is patient a fall risk? No   Fall screen comments slipped on ice   Abuse Screen (yes response referral indicated)   Feels Unsafe at Home or Work/School no   Feels Threatened by Someone no   Does Anyone Try to Keep You From Having Contact with Others or Doing Things Outside Your Home? no   Physical Signs of Abuse Present no   System Outcome Measures   Outcome Measures Low Back Pain (see Oswestry and Natalie)   Lumbar Spine/SI Objective Findings   Posture incr lordosis   Gait/Locomotion Amb with L limp   Balance/Proprioception (Single Leg Stance) L 26\", R 60+\"   Flexion ROM fingers just past knee +L LBP   Extension ROM mod limited ++ L LBP   Right Side Bending ROM WFL \"stretch L side\"   Left Side Bending ROM mod limited ++ L LBP   Repeated Extension-Standing ROM WNL +L LBP   Repeated Flexion-Standing ROM mod limited ++L LBP   Hip Screen pain with L hip IR antigravity, -MARIZOL, - FADIR   Transversus Abdominus Strength (Albert Leg Lowering-deg) plank hold 15\"   Hip Flexion (L2) Strength L 4/5, R 5/5   Hip Abduction Strength 5/5   Hip Adduction Strength 5/5   Hip Extension Strength L 4/5, R 4+/5   Knee Flexion Strength 5/5   Knee Extension (L3) Strength 5/5   Ankle Dorsiflexion (L4) Strength 5/5   Great Toe Extension (L5) Strength 5/5   Ankle Plantar Flexion (S1) Strength 5/5   Hamstring Flexibility tight at 45* SLR B   Hip Flexor Flexibility tight L>R   Piriformis Flexibility " tight L   SLR -   Crossover SLR -   Slump Test -   Spring Test - L spine   Segmental Mobility normal L spine   Palpation tender L>R psoas and lumbar paraspinals, L QL,  L piriformis, L gluts and insertion, nontender IT band   Planned Therapy Interventions   Planned Therapy Interventions stretching;strengthening;ROM;manual therapy   Planned Modality Interventions   Planned Modality Interventions Traction  (if pt does not improve with exs and MT)   Clinical Impression   Criteria for Skilled Therapeutic Interventions Met yes, treatment indicated   PT Diagnosis L LBP and L lat hip pain. Signs and symptoms suggest possible internal derangement of LB and L glut strain.   Influenced by the following impairments pain and weakness   Functional limitations due to impairments walking, sitting, standing, stairs, driving   Clinical Presentation Stable/Uncomplicated   Clinical Presentation Rationale clinical judgement   Clinical Decision Making (Complexity) Low complexity   Therapy Frequency 1 time/week   Predicted Duration of Therapy Intervention (days/wks) 8 weeks   Risk & Benefits of therapy have been explained Yes   Patient, Family & other staff in agreement with plan of care Yes   Education Assessment   Preferred Learning Style Listening;Demonstration;Pictures/video   Barriers to Learning No barriers   ORTHO GOALS   PT Ortho Eval Goals 1;2;3;4   Ortho Goal 1   Goal Identifier 1   Goal Description Pt will be able to sit for > 1 hour with < 3/10 pain.   Target Date 04/02/20   Ortho Goal 2   Goal Identifier 2   Goal Description Pt will be able to stand > 1 hour with < 3/10 pain.   Target Date 04/16/20   Ortho Goal 3   Goal Identifier 3   Goal Description Pt will be able to walk> 1/4 mile with < 3/10 pain.   Target Date 04/30/20   Ortho Goal 4   Goal Identifier 4   Goal Description Pt will be independent with HEP for optimal functional recovery.   Target Date 04/30/20   Total Evaluation Time   PT Eval, Low Complexity Minutes  (16853) 20     Catherine Allen PT

## 2020-11-16 ENCOUNTER — HEALTH MAINTENANCE LETTER (OUTPATIENT)
Age: 40
End: 2020-11-16

## 2021-09-11 ENCOUNTER — MYC MEDICAL ADVICE (OUTPATIENT)
Dept: FAMILY MEDICINE | Facility: CLINIC | Age: 41
End: 2021-09-11

## 2021-09-13 NOTE — TELEPHONE ENCOUNTER
Patient last saw this provider in 2019. Patient would like to know her blood type.   Please review and advise.   Aimee Panda CMA on 9/13/2021 at 11:46 AM

## 2021-09-18 ENCOUNTER — HEALTH MAINTENANCE LETTER (OUTPATIENT)
Age: 41
End: 2021-09-18

## 2021-11-17 ASSESSMENT — ENCOUNTER SYMPTOMS
SHORTNESS OF BREATH: 0
NAUSEA: 0
HEMATOCHEZIA: 0
COUGH: 0
HEARTBURN: 0
DIARRHEA: 0
DIZZINESS: 0
HEADACHES: 0
PALPITATIONS: 0
HEMATURIA: 0
DYSURIA: 0
CONSTIPATION: 0
BREAST MASS: 0
ARTHRALGIAS: 1
WEAKNESS: 0
NERVOUS/ANXIOUS: 0
EYE PAIN: 0
JOINT SWELLING: 0
CHILLS: 0
FREQUENCY: 0
ABDOMINAL PAIN: 0
PARESTHESIAS: 0
FEVER: 0
MYALGIAS: 1
SORE THROAT: 0

## 2021-11-19 ENCOUNTER — OFFICE VISIT (OUTPATIENT)
Dept: FAMILY MEDICINE | Facility: CLINIC | Age: 41
End: 2021-11-19
Payer: COMMERCIAL

## 2021-11-19 VITALS
WEIGHT: 201.8 LBS | TEMPERATURE: 98.3 F | RESPIRATION RATE: 16 BRPM | OXYGEN SATURATION: 97 % | SYSTOLIC BLOOD PRESSURE: 130 MMHG | HEART RATE: 100 BPM | HEIGHT: 64 IN | BODY MASS INDEX: 34.45 KG/M2 | DIASTOLIC BLOOD PRESSURE: 74 MMHG

## 2021-11-19 DIAGNOSIS — K21.9 GASTROESOPHAGEAL REFLUX DISEASE WITHOUT ESOPHAGITIS: ICD-10-CM

## 2021-11-19 DIAGNOSIS — G47.00 PERSISTENT INSOMNIA: Chronic | ICD-10-CM

## 2021-11-19 DIAGNOSIS — Z28.21 VACCINATION REFUSED BY PATIENT: ICD-10-CM

## 2021-11-19 DIAGNOSIS — E66.09 NON MORBID OBESITY DUE TO EXCESS CALORIES: Chronic | ICD-10-CM

## 2021-11-19 DIAGNOSIS — R79.89 LOW TSH LEVEL: ICD-10-CM

## 2021-11-19 DIAGNOSIS — R87.610 ASCUS OF CERVIX WITH NEGATIVE HIGH RISK HPV: ICD-10-CM

## 2021-11-19 DIAGNOSIS — Z00.00 ROUTINE GENERAL MEDICAL EXAMINATION AT A HEALTH CARE FACILITY: Primary | ICD-10-CM

## 2021-11-19 DIAGNOSIS — F17.200 TOBACCO USE DISORDER: ICD-10-CM

## 2021-11-19 DIAGNOSIS — Z23 NEED FOR VACCINATION: ICD-10-CM

## 2021-11-19 DIAGNOSIS — G47.33 OSA (OBSTRUCTIVE SLEEP APNEA): Chronic | ICD-10-CM

## 2021-11-19 LAB
ALBUMIN SERPL-MCNC: 3.7 G/DL (ref 3.4–5)
ALP SERPL-CCNC: 74 U/L (ref 40–150)
ALT SERPL W P-5'-P-CCNC: 31 U/L (ref 0–50)
ANION GAP SERPL CALCULATED.3IONS-SCNC: 5 MMOL/L (ref 3–14)
AST SERPL W P-5'-P-CCNC: 11 U/L (ref 0–45)
BILIRUB SERPL-MCNC: 0.5 MG/DL (ref 0.2–1.3)
BUN SERPL-MCNC: 13 MG/DL (ref 7–30)
CALCIUM SERPL-MCNC: 8.2 MG/DL (ref 8.5–10.1)
CHLORIDE BLD-SCNC: 108 MMOL/L (ref 94–109)
CHOLEST SERPL-MCNC: 154 MG/DL
CO2 SERPL-SCNC: 27 MMOL/L (ref 20–32)
CREAT SERPL-MCNC: 0.68 MG/DL (ref 0.52–1.04)
FASTING STATUS PATIENT QL REPORTED: NO
GFR SERPL CREATININE-BSD FRML MDRD: >90 ML/MIN/1.73M2
GLUCOSE BLD-MCNC: 108 MG/DL (ref 70–99)
HDLC SERPL-MCNC: 90 MG/DL
LDLC SERPL CALC-MCNC: 42 MG/DL
NONHDLC SERPL-MCNC: 64 MG/DL
POTASSIUM BLD-SCNC: 4.1 MMOL/L (ref 3.4–5.3)
PROT SERPL-MCNC: 7 G/DL (ref 6.8–8.8)
SODIUM SERPL-SCNC: 140 MMOL/L (ref 133–144)
T4 FREE SERPL-MCNC: 1.16 NG/DL (ref 0.76–1.46)
TRIGL SERPL-MCNC: 112 MG/DL
TSH SERPL DL<=0.005 MIU/L-ACNC: 0.23 MU/L (ref 0.4–4)

## 2021-11-19 PROCEDURE — 80061 LIPID PANEL: CPT | Performed by: FAMILY MEDICINE

## 2021-11-19 PROCEDURE — 36415 COLL VENOUS BLD VENIPUNCTURE: CPT | Performed by: FAMILY MEDICINE

## 2021-11-19 PROCEDURE — 90471 IMMUNIZATION ADMIN: CPT | Performed by: FAMILY MEDICINE

## 2021-11-19 PROCEDURE — 80053 COMPREHEN METABOLIC PANEL: CPT | Performed by: FAMILY MEDICINE

## 2021-11-19 PROCEDURE — 87389 HIV-1 AG W/HIV-1&-2 AB AG IA: CPT | Performed by: FAMILY MEDICINE

## 2021-11-19 PROCEDURE — 90732 PPSV23 VACC 2 YRS+ SUBQ/IM: CPT | Performed by: FAMILY MEDICINE

## 2021-11-19 PROCEDURE — 99396 PREV VISIT EST AGE 40-64: CPT | Mod: 25 | Performed by: FAMILY MEDICINE

## 2021-11-19 PROCEDURE — 86803 HEPATITIS C AB TEST: CPT | Performed by: FAMILY MEDICINE

## 2021-11-19 PROCEDURE — 99214 OFFICE O/P EST MOD 30 MIN: CPT | Mod: 25 | Performed by: FAMILY MEDICINE

## 2021-11-19 PROCEDURE — 84439 ASSAY OF FREE THYROXINE: CPT | Performed by: FAMILY MEDICINE

## 2021-11-19 PROCEDURE — 84443 ASSAY THYROID STIM HORMONE: CPT | Performed by: FAMILY MEDICINE

## 2021-11-19 RX ORDER — TRAZODONE HYDROCHLORIDE 50 MG/1
25-100 TABLET, FILM COATED ORAL AT BEDTIME
Qty: 30 TABLET | Refills: 1 | Status: SHIPPED | OUTPATIENT
Start: 2021-11-19 | End: 2022-07-22

## 2021-11-19 ASSESSMENT — ENCOUNTER SYMPTOMS
DIZZINESS: 0
EYE PAIN: 0
FEVER: 0
NERVOUS/ANXIOUS: 0
HEMATURIA: 0
JOINT SWELLING: 0
DIARRHEA: 0
NAUSEA: 0
CHILLS: 0
HEARTBURN: 0
BREAST MASS: 0
HEADACHES: 0
HEMATOCHEZIA: 0
SORE THROAT: 0
MYALGIAS: 1
ARTHRALGIAS: 1
CONSTIPATION: 0
COUGH: 0
WEAKNESS: 0
PARESTHESIAS: 0
DYSURIA: 0
SHORTNESS OF BREATH: 0
FREQUENCY: 0
PALPITATIONS: 0
ABDOMINAL PAIN: 0

## 2021-11-19 ASSESSMENT — PAIN SCALES - GENERAL: PAINLEVEL: NO PAIN (0)

## 2021-11-19 ASSESSMENT — MIFFLIN-ST. JEOR: SCORE: 1555.36

## 2021-11-19 NOTE — NURSING NOTE
Prior to immunization administration, verified patients identity using patient s name and date of birth. Please see Immunization Activity for additional information.     Screening Questionnaire for Adult Immunization    Are you sick today?   No   Do you have allergies to medications, food, a vaccine component or latex?   No   Have you ever had a serious reaction after receiving a vaccination?   No   Do you have a long-term health problem with heart, lung, kidney, or metabolic disease (e.g., diabetes), asthma, a blood disorder, no spleen, complement component deficiency, a cochlear implant, or a spinal fluid leak?  Are you on long-term aspirin therapy?   Yes   Do you have cancer, leukemia, HIV/AIDS, or any other immune system problem?   No   Do you have a parent, brother, or sister with an immune system problem?   No   In the past 3 months, have you taken medications that affect  your immune system, such as prednisone, other steroids, or anticancer drugs; drugs for the treatment of rheumatoid arthritis, Crohn s disease, or psoriasis; or have you had radiation treatments?   No   Have you had a seizure, or a brain or other nervous system problem?   No   During the past year, have you received a transfusion of blood or blood    products, or been given immune (gamma) globulin or antiviral drug?   No   For women: Are you pregnant or is there a chance you could become       pregnant during the next month?   No   Have you received any vaccinations in the past 4 weeks?   No     Immunization questionnaire answers were all negative.        Per orders of Dr. Ramirez, injection of Pneumovax given by Aimee Panda CMA. Patient instructed to remain in clinic for 15 minutes afterwards, and to report any adverse reaction to me immediately.       Screening performed by Aimee Panda CMA on 11/19/2021 at 1:38 PM.

## 2021-11-19 NOTE — PROGRESS NOTES
SUBJECTIVE:   CC: Keely Godinez is an 41 year old woman who presents for preventive health visit.       Patient has been advised of split billing requirements and indicates understanding: Yes  Healthy Habits:    Getting at least 3 servings of Calcium per day:  Yes    Bi-annual eye exam:  Yes    Dental care twice a year:  NO    Sleep apnea or symptoms of sleep apnea:  Daytime drowsiness, Excessive snoring and Sleep apnea    Diet:  Regular (no restrictions)    Frequency of exercise:  None    Taking medications regularly:  Yes    Medication side effects:  Not applicable    PHQ-2 Total Score:    Additional concerns today:  No    Keely is here today for physical and general follow-up.  No specific concerns today except of insomnia.  Stated that she has trouble falling and maintaining sleep at times.  OTC med with no effect.  She also has mild sleep apnea, could not tolerate the mouth guard.  Last sleep study was earlier this year.  Been gaining some weight since then.  Denied excessive daytime sleepiness or fatigue.  She was told that she snores loud at night.    Otherwise, she is doing well. No major medical care or procedure done since the last physical couple years ago. No headache, dizziness or acute visual change. No runny nose, nasal congestion, coughing, fever or chill. No CP/SOB. No N/V/D/C. No problem with urination.  No abdominal pain.  Normal and regular menstrual cycle - no hot flashes. No history of abnormal pap, last pap was 2 years ago and it was normal with negative HR HPV.  Denied of STD risks.  No leg swelling or pain.  Not exercising. Social drinker but no drug.  Smokes about 1/2 ppd. Feels safe - lives with .  Denied of depression. No other concern today    Today's PHQ-2 Score:   PHQ-2 ( 1999 Pf[employer]) 11/17/2021   Q1: Little interest or pleasure in doing things 0   Q2: Feeling down, depressed or hopeless 0   PHQ-2 Score 0   PHQ-2 Total Score (12-17 Years)- Positive if 3 or more points;  Administer PHQ-A if positive -   Q1: Little interest or pleasure in doing things Not at all   Q2: Feeling down, depressed or hopeless Not at all   PHQ-2 Score 0       Abuse: Current or Past (Physical, Sexual or Emotional) - No  Do you feel safe in your environment? Yes    Have you ever done Advance Care Planning? (For example, a Health Directive, POLST, or a discussion with a medical provider or your loved ones about your wishes): No, advance care planning information given to patient to review.  Advanced care planning was discussed at today's visit.    Social History     Tobacco Use     Smoking status: Current Every Day Smoker     Packs/day: 0.75     Years: 23.00     Pack years: 17.25     Types: Cigarettes     Smokeless tobacco: Never Used     Tobacco comment: started age 13   Substance Use Topics     Alcohol use: Yes     Alcohol/week: 0.0 standard drinks     Comment: socially     If you drink alcohol do you typically have >3 drinks per day or >7 drinks per week? No    Alcohol Use 11/17/2021   Prescreen: >3 drinks/day or >7 drinks/week? No   Prescreen: >3 drinks/day or >7 drinks/week? -   No flowsheet data found.    Reviewed orders with patient.  Reviewed health maintenance and updated orders accordingly - Yes  Patient Active Problem List   Diagnosis     Tobacco use disorder     ASCUS of cervix with negative high risk HPV     Non morbid obesity due to excess calories     Persistent insomnia     CORINA (obstructive sleep apnea)- mild (AHI 6)     Episodic tension-type headache, not intractable     Female infertility associated with anovulation     GERD (gastroesophageal reflux disease)     Lumbar disc herniation with radiculopathy     Past Surgical History:   Procedure Laterality Date     NO HISTORY OF SURGERY         Social History     Tobacco Use     Smoking status: Current Every Day Smoker     Packs/day: 0.75     Years: 23.00     Pack years: 17.25     Types: Cigarettes     Smokeless tobacco: Never Used     Tobacco  "comment: started age 13   Substance Use Topics     Alcohol use: Yes     Alcohol/week: 0.0 standard drinks     Comment: socially     Family History   Problem Relation Age of Onset     Sleep Apnea Mother      Obesity Mother      Arthritis Mother      Diabetes Mother      Suicide Father 37     Depression Father      Anxiety Disorder Father      Arthritis Father      Asthma Maternal Grandmother      Coronary Artery Disease Maternal Grandfather      Coronary Artery Disease Paternal Grandfather      Breast Cancer Paternal Grandmother      Arthritis Paternal Grandmother      No Known Problems Daughter      Obesity Brother      Sleep Apnea Brother      Diabetes Brother      Thyroid Disease Sister      Arthritis Sister      Arthritis Maternal Aunt          Current Outpatient Medications   Medication Sig Dispense Refill     traZODone (DESYREL) 50 MG tablet Take 0.5-2 tablets ( mg) by mouth At Bedtime 30 tablet 1     OMEPRAZOLE PO Take 20 mg by mouth as needed       Allergies   Allergen Reactions     Fentanyl      Other reaction(s): Respiratory Depression     Midazolam      Other reaction(s): Respiratory Depression     Seasonal Allergies      Unknown [No Clinical Screening - See Comments] Difficulty breathing     She is unsure of name. Numbing medicine, starts with an \"s or C\".      Varenicline      assaultive ideation       Breast Cancer Screening:    FHS-7:   Breast CA Risk Assessment (FHS-7) 11/17/2021   Did any of your first-degree relatives have breast or ovarian cancer? No   Did any of your relatives have bilateral breast cancer? No   Did any man in your family have breast cancer? No   Did any woman in your family have breast and ovarian cancer? No   Did any woman in your family have breast cancer before age 50 y? No   Do you have 2 or more relatives with breast and/or ovarian cancer? No   Do you have 2 or more relatives with breast and/or bowel cancer? No       Mammogram Screening - Offered annual screening and " "updated Health Maintenance for mutual plan based on risk factor consideration    Pertinent mammograms are reviewed under the imaging tab.    History of abnormal Pap smear: NO - age 30-65 PAP every 5 years with negative HPV co-testing recommended  PAP / HPV Latest Ref Rng & Units 5/6/2019 5/26/2016   PAP (Historical) - NIL ASC-US(A)   HPV16 NEG:Negative Negative Negative   HPV18 NEG:Negative Negative Negative   HRHPV NEG:Negative Negative Negative     Reviewed and updated as needed this visit by clinical staff  Tobacco  Allergies  Meds   Med Hx  Surg Hx  Fam Hx  Soc Hx       Reviewed and updated as needed this visit by Provider               Past Medical History:   Diagnosis Date     ASCUS of cervix with negative high risk HPV 5/26/16 5/26/16 ASCUS/neg HR HPV      Uncomplicated asthma       Past Surgical History:   Procedure Laterality Date     NO HISTORY OF SURGERY         Review of Systems   Constitutional: Negative for chills and fever.   HENT: Negative for congestion, ear pain, hearing loss and sore throat.    Eyes: Negative for pain and visual disturbance.   Respiratory: Negative for cough and shortness of breath.    Cardiovascular: Negative for chest pain, palpitations and peripheral edema.   Gastrointestinal: Negative for abdominal pain, constipation, diarrhea, heartburn, hematochezia and nausea.   Breasts:  Negative for tenderness, breast mass and discharge.   Genitourinary: Negative for dysuria, frequency, genital sores, hematuria, pelvic pain, urgency, vaginal bleeding and vaginal discharge.   Musculoskeletal: Positive for arthralgias and myalgias. Negative for joint swelling.   Skin: Negative for rash.   Neurological: Negative for dizziness, weakness, headaches and paresthesias.   Psychiatric/Behavioral: Negative for mood changes. The patient is not nervous/anxious.           OBJECTIVE:   /74   Pulse 100   Temp 98.3  F (36.8  C) (Temporal)   Resp 16   Ht 1.626 m (5' 4\")   Wt 91.5 kg " (201 lb 12.8 oz)   LMP 11/07/2021   SpO2 97%   BMI 34.64 kg/m    Physical Exam   GENERAL: healthy, alert and no distress.  Speaking in full sentences.  EYES: Eyes grossly normal to inspection, PERRL and conjunctivae and sclerae normal.  No nystagmus.  All 4 visual fields intact.  HENT: ear canals and TM's normal.  Nares are non-congested. Oropharynx is pink and moist. No tender with palpation to the sinuses.   NECK: no adenopathy, supple, no lymphadenopathy or thyromegaly.  No tender with palpation to the cervical spine or its paraspinous muscle.  RESP: lungs clear to auscultation - no rales, rhonchi or wheezes.  Good respiratory effort throughout.  BREAST: Offered but she declined.  She has no concern about it  CV: regular rate and rhythm, no murmur  ABDOMEN: soft, nontender, nondistended, no palpable masses organomegaly with normal culture.  No CVA tenderness.   (female): Offered but declined.  She has no concerned about it  MS: no gross musculoskeletal defects noted, no edema. All joints are in the normal range of motion and 4 extremities were equally in strength. Hips, knees, ankles, shoulders, elbows, wrists exams were normal. Fine motor skills of the fingers are intact. Back is straight, no tender with palpation to the spine.  SKIN: no suspicious lesions or rashes  NEURO: Normal strength and tone, mentation intact and speech normal.  Cranial nerve II through XII intact.  DTRs +2 throughout.  No focal neurological deficit.  PSYCH: mentation appears normal, affect normal/bright.  Thoughts intact, suicidal/homicidal ideation.  No hallucination.      Diagnostic Test Results:  Labs reviewed in Epic  Results for orders placed or performed in visit on 11/19/21   Lipid panel reflex to direct LDL Fasting     Status: None   Result Value Ref Range    Cholesterol 154 <200 mg/dL    Triglycerides 112 <150 mg/dL    Direct Measure HDL 90 >=50 mg/dL    LDL Cholesterol Calculated 42 <=100 mg/dL    Non HDL Cholesterol 64  <130 mg/dL    Patient Fasting > 8hrs? No     Narrative    Cholesterol  Desirable:  <200 mg/dL    Triglycerides  Normal:  Less than 150 mg/dL  Borderline High:  150-199 mg/dL  High:  200-499 mg/dL  Very High:  Greater than or equal to 500 mg/dL    Direct Measure HDL  Female:  Greater than or equal to 50 mg/dL   Male:  Greater than or equal to 40 mg/dL    LDL Cholesterol  Desirable:  <100mg/dL  Above Desirable:  100-129 mg/dL   Borderline High:  130-159 mg/dL   High:  160-189 mg/dL   Very High:  >= 190 mg/dL    Non HDL Cholesterol  Desirable:  130 mg/dL  Above Desirable:  130-159 mg/dL  Borderline High:  160-189 mg/dL  High:  190-219 mg/dL  Very High:  Greater than or equal to 220 mg/dL   TSH with free T4 reflex     Status: Abnormal   Result Value Ref Range    TSH 0.23 (L) 0.40 - 4.00 mU/L   Comprehensive metabolic panel (BMP + Alb, Alk Phos, ALT, AST, Total. Bili, TP)     Status: Abnormal   Result Value Ref Range    Sodium 140 133 - 144 mmol/L    Potassium 4.1 3.4 - 5.3 mmol/L    Chloride 108 94 - 109 mmol/L    Carbon Dioxide (CO2) 27 20 - 32 mmol/L    Anion Gap 5 3 - 14 mmol/L    Urea Nitrogen 13 7 - 30 mg/dL    Creatinine 0.68 0.52 - 1.04 mg/dL    Calcium 8.2 (L) 8.5 - 10.1 mg/dL    Glucose 108 (H) 70 - 99 mg/dL    Alkaline Phosphatase 74 40 - 150 U/L    AST 11 0 - 45 U/L    ALT 31 0 - 50 U/L    Protein Total 7.0 6.8 - 8.8 g/dL    Albumin 3.7 3.4 - 5.0 g/dL    Bilirubin Total 0.5 0.2 - 1.3 mg/dL    GFR Estimate >90 >60 mL/min/1.73m2   Hepatitis C antibody     Status: Normal   Result Value Ref Range    Hepatitis C Antibody Nonreactive Nonreactive    Narrative    Assay performance characteristics have not been established for newborns, infants, and children.   HIV Antigen Antibody Combo     Status: Normal   Result Value Ref Range    HIV Antigen Antibody Combo Nonreactive Nonreactive   T4 free     Status: Normal   Result Value Ref Range    Free T4 1.16 0.76 - 1.46 ng/dL       ASSESSMENT/PLAN:   (Z00.00) Routine  general medical examination at a health care facility  (primary encounter diagnosis)  Comment: Overall Keely is doing well.  Received his Pneumovax but declined Covid and flu vaccinations today.  Discussed about safety issue, healthy diet, exercising, weight management good sleeping hygiene, advanced directive care, falling prevention, and depression prevention. Recommended daily exercise for at least 30 minutes.  Emphasized on healthy diet with adequate fluid intake and resting. Feels safe.  Follow in 1 year, earlier as needed.  Labs as ordered below.  Colon cancer screening at 45-50, no risk identified.  Discussed about breast cancer screening, she is interested and therefore mammogram was ordered.  UTD for cervical cancer screening.  All of her questions were answered.    Plan: Lipid panel reflex to direct LDL Fasting, TSH         with free T4 reflex, Comprehensive metabolic         panel (BMP + Alb, Alk Phos, ALT, AST, Total.         Bili, TP), Hepatitis C antibody, HIV Antigen         Antibody Combo, *MA Screening Digital         Bilateral, T4 free            (G47.33) CORINA (obstructive sleep apnea)- mild (AHI 6)  Comment: She was recently diagnosed with mild sleep apnea and the mouthguard was prescribed.  Unfortunately, she has not tolerated a mouthguard.  I recommend her to work with her dentist closely and consider to adjust its setting slowly.  Also encouraged to work on weight loss.      (K21.9) Gastroesophageal reflux disease without esophagitis  Comment: Overall stable and is controlled.  Taking omeprazole prn and takes it rarely.  Emphasized on diet modification, avoiding greasy/spicy food or late meals.  She does not drink alcohol or takeNSAIDS excessively.  Follow-up as needed.      (R87.610) ASCUS of cervix with negative high risk HPV  Comment: 5/26/16 ASCUS/neg HR HPV. Plan: cotest in 3 years.          5/6/19 NIL pap, neg HR HPV. Plan: Cotest in 5 years.      (G47.00) Persistent insomnia  Comment: Good  sleeping hygiene discussed.  Will have her try trazodone.  Potential side effect discussed.  Follow-up as needed    Plan: traZODone (DESYREL) 50 MG tablet            (F17.200) Tobacco use disorder  Comment: Keely has been smoking for 25 years - currently smoking about 1/2 ppd.  Discussed with her about the long and short term consequences of tobacco smoking.  Discussed with her about different options for smoking cessation aids.  She is not ready to quit smoking at this time.  Encourage to let me know when she is ready to quit.  In a mean time, I encourage her to reduce to amount of cigarrets smoke as much as possible.  All of her questions were answered.      (Z23) Need for vaccination  Comment: No contraindication indicated.  Side effect discussed and otc med for symptomatic treatment.     Plan: Pneumococcal vaccine 23 valent PPSV23          (Pneumovax) [25098]            (Z28.21) Vaccination refused by patient  Comment: She was recommended to get the influenza and Covid vaccination but declined.  Risk and benefit discussed, she is willing to take the risk.      (E66.09) Non morbid obesity due to excess calories  Comment: Today's BMI was 35.  Discussed about its potential long and short term complications.  Emphasized on healthy lifestyle modification as discussed above and discussed about healthy/portioned diet.  Declined dietitian consult.  Discussed about the goal for her weight and his BMI.  TSH level was checked today and no indication for hypothyroidism.      (R79.89) Low TSH level  Comment: The TSH level was about the same as it was 5 years ago.  Free T4 level was normal.  Rescreen in a year.      Patient has been advised of split billing requirements and indicates understanding: Yes  COUNSELING:  Reviewed preventive health counseling, as reflected in patient instructions       Regular exercise       Healthy diet/nutrition       Vision screening       Hearing screening       Immunizations    Vaccinated for:  "Pneumococcal and Declined: Influenza and covid due to Conscientious objector               Alcohol Use       Contraception       Family planning       Osteoporosis prevention/bone health       Safe sex practices/STD prevention       Colon cancer screening       Consider Hep C screening for all patients one time for ages 18-79 years       HIV screeninx in teen years, 1x in adult years, and at intervals if high risk       (Taylor)menopause management       One time pneumovax for smokers       Advance Care Planning    Estimated body mass index is 34.64 kg/m  as calculated from the following:    Height as of this encounter: 1.626 m (5' 4\").    Weight as of this encounter: 91.5 kg (201 lb 12.8 oz).    Weight management plan: Discussed healthy diet and exercise guidelines    She reports that she has been smoking cigarettes. She has a 17.25 pack-year smoking history. She has never used smokeless tobacco.  Tobacco Cessation Action Plan:   Information offered: Patient not interested at this time      Counseling Resources:  ATP IV Guidelines  Pooled Cohorts Equation Calculator  Breast Cancer Risk Calculator  BRCA-Related Cancer Risk Assessment: FHS-7 Tool  FRAX Risk Assessment  ICSI Preventive Guidelines  Dietary Guidelines for Americans,   USDA's MyPlate  ASA Prophylaxis  Lung CA Screening    Juan Nuno Mai, MD  [facility]  "

## 2021-11-22 LAB
HCV AB SERPL QL IA: NONREACTIVE
HIV 1+2 AB+HIV1 P24 AG SERPL QL IA: NONREACTIVE

## 2021-12-01 PROBLEM — R79.89 LOW TSH LEVEL: Status: ACTIVE | Noted: 2021-12-01

## 2022-06-17 ENCOUNTER — TELEPHONE (OUTPATIENT)
Dept: FAMILY MEDICINE | Facility: CLINIC | Age: 42
End: 2022-06-17

## 2022-06-17 NOTE — TELEPHONE ENCOUNTER
Please let patient know that I recommend to get the mammogram done as her earliest convenience as discussed at her last physical. Please help her to schedule for one if she is interested.  I extended to the order.  [email or web address]

## 2022-07-06 ENCOUNTER — HOSPITAL ENCOUNTER (OUTPATIENT)
Dept: MAMMOGRAPHY | Facility: CLINIC | Age: 42
Discharge: HOME | End: 2022-07-06
Attending: FAMILY MEDICINE | Admitting: FAMILY MEDICINE
Payer: COMMERCIAL

## 2022-07-06 DIAGNOSIS — Z00.00 ROUTINE GENERAL MEDICAL EXAMINATION AT A HEALTH CARE FACILITY: ICD-10-CM

## 2022-07-06 PROCEDURE — 77067 SCR MAMMO BI INCL CAD: CPT

## 2022-07-18 ENCOUNTER — ANCILLARY PROCEDURE (OUTPATIENT)
Dept: ULTRASOUND IMAGING | Facility: CLINIC | Age: 42
End: 2022-07-18
Attending: FAMILY MEDICINE
Payer: COMMERCIAL

## 2022-07-18 DIAGNOSIS — R92.8 ABNORMAL MAMMOGRAM: ICD-10-CM

## 2022-07-18 PROCEDURE — 76642 ULTRASOUND BREAST LIMITED: CPT | Mod: RT | Performed by: RADIOLOGY

## 2022-07-22 ENCOUNTER — OFFICE VISIT (OUTPATIENT)
Dept: URGENT CARE | Facility: URGENT CARE | Age: 42
End: 2022-07-22
Payer: COMMERCIAL

## 2022-07-22 VITALS
WEIGHT: 179 LBS | DIASTOLIC BLOOD PRESSURE: 80 MMHG | HEART RATE: 84 BPM | HEIGHT: 64 IN | SYSTOLIC BLOOD PRESSURE: 131 MMHG | BODY MASS INDEX: 30.56 KG/M2 | TEMPERATURE: 98 F | OXYGEN SATURATION: 96 %

## 2022-07-22 DIAGNOSIS — L24.7 IRRITANT CONTACT DERMATITIS DUE TO PLANTS, EXCEPT FOOD: Primary | ICD-10-CM

## 2022-07-22 PROCEDURE — 99213 OFFICE O/P EST LOW 20 MIN: CPT | Performed by: PHYSICIAN ASSISTANT

## 2022-07-22 RX ORDER — PREDNISONE 20 MG/1
TABLET ORAL
Qty: 10 TABLET | Refills: 0 | Status: SHIPPED | OUTPATIENT
Start: 2022-07-22

## 2022-07-22 RX ORDER — BENZOCAINE/MENTHOL 6 MG-10 MG
LOZENGE MUCOUS MEMBRANE 2 TIMES DAILY
COMMUNITY

## 2022-07-22 NOTE — PROGRESS NOTES
"  Chief Complaint   Patient presents with     Rash     Pt said that she has rash all over her body that started yesterday.                 ASSESSMENT:       ICD-10-CM    1. Irritant contact dermatitis due to plants, except food  L24.7 predniSONE (DELTASONE) 20 MG tablet         PLAN: Contact dermatitis.  Prescribed prednisone.  Use over-the-counter Benadryl.    I have discussed clinical findings with patient.  Side effects of medications discussed.  Symptomatic care is discussed.  I have discussed the possibility of  worsening symptoms and indication to RTC or go to the ER if they occur.  All questions are answered, patient indicates understanding of these issues and is in agreement with plan.   Patient care instructions are discussed/given at the end of visit.   Lots of rest and fluids.      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  Has been using over-the-counter hydrocortisone ointment.      Allergies   Allergen Reactions     Fentanyl      Other reaction(s): Respiratory Depression     Midazolam      Other reaction(s): Respiratory Depression     Seasonal Allergies      Unknown [No Clinical Screening - See Comments] Difficulty breathing     She is unsure of name. Numbing medicine, starts with an \"s or C\".      Varenicline      assaultive ideation       Past Medical History:   Diagnosis Date     ASCUS of cervix with negative high risk HPV 5/26/16 5/26/16 ASCUS/neg HR HPV      Uncomplicated asthma        hydrocortisone (CORTAID) 1 % external cream, Apply topically 2 times daily  OMEPRAZOLE PO, Take 20 mg by mouth as needed (Patient not taking: Reported on 7/22/2022)  traZODone (DESYREL) 50 MG tablet, Take 0.5-2 tablets ( mg) by mouth At Bedtime    No current facility-administered medications on file prior to visit.      Social History     Tobacco Use     Smoking " "status: Current Every Day Smoker     Packs/day: 0.75     Years: 23.00     Pack years: 17.25     Types: Cigarettes     Smokeless tobacco: Never Used     Tobacco comment: started age 13   Substance Use Topics     Alcohol use: Yes     Alcohol/week: 0.0 standard drinks     Comment: socially       ROS:  CONSTITUTIONAL: Negative for fatigue or fever.  EYES: Negative for eye problems.  ENT: Neg for ST.  RESP: Neg cough..  CV: Negative for chest pains.  GI: Negative for vomiting.  MUSCULOSKELETAL:  Negative for significant muscle or joint pains.  NEUROLOGIC: Negative for headaches.  SKIN: as above.  PSYCH: Normal mentation for age.    OBJECTIVE:  /80 (BP Location: Left arm, Patient Position: Sitting, Cuff Size: Adult Regular)   Pulse 84   Temp 98  F (36.7  C) (Tympanic)   Ht 1.626 m (5' 4\")   Wt 81.2 kg (179 lb)   SpO2 96%   BMI 30.73 kg/m    GENERAL APPEARANCE: Healthy, alert and no distress.  EYES:Conjunctiva/sclera clear.  THROAT: No erythema w/o tonsillar enlargement . No exudates.  NECK: Supple, nontender, no lymphadenopathy.  RESP: Lungs clear to auscultation - no rales, rhonchi or wheezes  CV: Regular rate and rhythm, normal S1 S2, no murmur noted.  NEURO: Awake, alert    SKIN: Arms and face with scattered papules, some blisters, some linear patterns.    No tongue, lip, throat swelling.        Samantha Purvis PA-C    "

## 2022-11-20 ENCOUNTER — HEALTH MAINTENANCE LETTER (OUTPATIENT)
Age: 42
End: 2022-11-20

## 2022-12-24 ENCOUNTER — HEALTH MAINTENANCE LETTER (OUTPATIENT)
Age: 42
End: 2022-12-24

## 2023-06-19 ENCOUNTER — PATIENT OUTREACH (OUTPATIENT)
Dept: CARE COORDINATION | Facility: CLINIC | Age: 43
End: 2023-06-19
Payer: COMMERCIAL

## 2023-07-17 ENCOUNTER — PATIENT OUTREACH (OUTPATIENT)
Dept: CARE COORDINATION | Facility: CLINIC | Age: 43
End: 2023-07-17
Payer: COMMERCIAL

## 2023-11-19 ENCOUNTER — HEALTH MAINTENANCE LETTER (OUTPATIENT)
Age: 43
End: 2023-11-19

## 2024-01-28 ENCOUNTER — HEALTH MAINTENANCE LETTER (OUTPATIENT)
Age: 44
End: 2024-01-28

## 2025-01-25 NOTE — LETTER
[facility]  [address] [address]  Phone: [phone]      REPORT OF WORK ABILITY    NOTE TO EMPLOYEE: You must promptly provide a copy of this report to your  employer or worker's compensation insurer, and Qualified Rehabilitation Consultant.    Date: 2/28/2020                     Employee Name: Keely Godinez         YOB: 1980  Medical Record Number: [id number]   Soc.Sec.No: [id number]  Employer: None                Date of Injury: none  Managed Care Organization / Insurance Company Name: UNKNOWN    Diagnosis: lumbar disc herniation and radiculopathy  Work Related: no     MMI: NO   Permanent Partial Disability(PPD) likely: UNKNOWN    EMPLOYEE IS ABLE TO WORK: with restrictions from today to 3-16-20 -  Full shift     RESTRICTIONS IF ANY:     Lift, carry no more than:  10 - 20 lb.Occasionally (2-4 hours)  Push/Pull:  10 - 20 lb.Occasionally (2-4 hours)  Ladder climb:  Not at all (0 hours)  Bend:  Not at all (0 hours)  Stoop:  Not at all (0 hours)  Twist:  Not at all (0 hours)    OTHER RESTRICTIONS: None    TREATMENT PLAN/NOTES: start PT.      /Mitchell Lao MD     [employer]

## 2025-02-01 ENCOUNTER — HEALTH MAINTENANCE LETTER (OUTPATIENT)
Age: 45
End: 2025-02-01